# Patient Record
Sex: FEMALE | Race: WHITE | NOT HISPANIC OR LATINO | Employment: FULL TIME | URBAN - NONMETROPOLITAN AREA
[De-identification: names, ages, dates, MRNs, and addresses within clinical notes are randomized per-mention and may not be internally consistent; named-entity substitution may affect disease eponyms.]

---

## 2017-02-21 DIAGNOSIS — F90.2 ATTENTION DEFICIT HYPERACTIVITY DISORDER (ADHD), COMBINED TYPE: ICD-10-CM

## 2017-02-21 NOTE — TELEPHONE ENCOUNTER
Adderall XR 30mg - patient requesting 3 month of scripts to keep on profile with pharmacy      Last Written Prescription Date:10/17/2016  Last Fill Quantity: 30,  # refills:0  Last Office Visit with [facility], [facility] or [facility] prescribing provider: 10/07/2016    Adderall 5mg      Last Written Prescription Date: 08/04/2016  Last Fill Quantity: 30,  # refills: 0   Last Office Visit with [facility], [facility] or [facility] prescribing provider: 10/07/2016    Mikayla Elizondo, Pharmacy Technician  [facility]  [phone]

## 2017-02-23 RX ORDER — DEXTROAMPHETAMINE SACCHARATE, AMPHETAMINE ASPARTATE MONOHYDRATE, DEXTROAMPHETAMINE SULFATE AND AMPHETAMINE SULFATE 7.5; 7.5; 7.5; 7.5 MG/1; MG/1; MG/1; MG/1
30 CAPSULE, EXTENDED RELEASE ORAL DAILY
Qty: 30 CAPSULE | Refills: 0 | Status: SHIPPED | OUTPATIENT
Start: 2017-02-23 | End: 2017-04-25

## 2017-02-23 RX ORDER — DEXTROAMPHETAMINE SACCHARATE, AMPHETAMINE ASPARTATE, DEXTROAMPHETAMINE SULFATE AND AMPHETAMINE SULFATE 1.25; 1.25; 1.25; 1.25 MG/1; MG/1; MG/1; MG/1
5 TABLET ORAL DAILY
Qty: 30 TABLET | Refills: 0 | Status: SHIPPED | OUTPATIENT
Start: 2017-02-23 | End: 2017-08-14

## 2017-03-01 ENCOUNTER — OFFICE VISIT (OUTPATIENT)
Dept: FAMILY MEDICINE | Facility: OTHER | Age: 37
End: 2017-03-01
Payer: COMMERCIAL

## 2017-03-01 VITALS
SYSTOLIC BLOOD PRESSURE: 124 MMHG | RESPIRATION RATE: 20 BRPM | WEIGHT: 174 LBS | OXYGEN SATURATION: 100 % | TEMPERATURE: 97.6 F | DIASTOLIC BLOOD PRESSURE: 72 MMHG | HEART RATE: 82 BPM | HEIGHT: 65 IN | BODY MASS INDEX: 28.99 KG/M2

## 2017-03-01 DIAGNOSIS — J20.9 ACUTE BRONCHITIS, UNSPECIFIED ORGANISM: Primary | ICD-10-CM

## 2017-03-01 PROCEDURE — 99213 OFFICE O/P EST LOW 20 MIN: CPT | Performed by: NURSE PRACTITIONER

## 2017-03-01 RX ORDER — AZITHROMYCIN 250 MG/1
TABLET, FILM COATED ORAL
Qty: 6 TABLET | Refills: 0 | Status: SHIPPED | OUTPATIENT
Start: 2017-03-01 | End: 2017-08-14

## 2017-03-01 RX ORDER — CODEINE PHOSPHATE AND GUAIFENESIN 10; 100 MG/5ML; MG/5ML
1 SOLUTION ORAL EVERY 4 HOURS PRN
Qty: 120 ML | Refills: 0 | Status: SHIPPED | OUTPATIENT
Start: 2017-03-01 | End: 2017-08-14

## 2017-03-01 RX ORDER — PREDNISONE 20 MG/1
40 TABLET ORAL DAILY
Qty: 10 TABLET | Refills: 0 | Status: SHIPPED | OUTPATIENT
Start: 2017-03-01 | End: 2017-03-06

## 2017-03-01 NOTE — MR AVS SNAPSHOT
After Visit Summary   3/1/2017    Cecy Wilkerson    MRN: [id number]           Patient Information     Date Of Birth          1980        Visit Information        Provider Department      3/1/2017 8:40 AM Kimmie Sutton APRN [facility]        Today's Diagnoses     Acute bronchitis, unspecified organism    -  1      Care Instructions    Start the antibiotics if you get worse again.     Take the Prednisone once a day for 5 days.  Take in the morning with food.     Use the cough medication at night if needed.  This will make you drowsy.     Follow up if symptoms fail to resolve as expected.       Acute Bronchitis  Your healthcare provider has told you that you have acute bronchitis. Bronchitis is infection or inflammation of the bronchial tubes (airways in the lungs). Normally, air moves easily in and out of the airways. Bronchitis narrows the airways, making it harder for air to flow in and out of the lungs. This causes symptoms such as shortness of breath, coughing, and wheezing. Bronchitis can be  acute  or  chronic.  Acute means the condition comes on quickly and goes away in a short time. Chronic means a condition lasts a long time and often comes back. Read on to learn more about acute bronchitis.    What causes acute bronchitis?  Acute bronchitis almost always starts as a viral respiratory infection, such as a cold or the flu. Certain factors make it more likely for a cold or flu to turn into bronchitis. These include being very young or very old or having a heart or lung problem. Cigarette smoking also makes bronchitis more likely.  When bronchitis develops, the airways become swollen. The airways may also become infected with bacteria. This is known as a secondary infection.  Diagnosing acute bronchitis  Your healthcare provider will examine you and ask about your symptoms and health history. You may also have a sputum culture to test the fluid in your lungs. Chest X-rays may  be done to look for infection in the lungs.  Treating acute bronchitis  Bronchitis usually clears up as the cold or flu goes away. You can help feel better faster by doing the following:    Take medicine as directed. You may be told to take ibuprofen or other over-the-counter medicines. These help relieve inflammation in your bronchial tubes. Your doctor may prescribe an inhaler to help open up the bronchial tubes. Most of the time, acute bronchitis is caused by a viral infection. Antibiotics are usually not prescribed for viral infections.    Drink plenty of fluids, such as water, juice, or warm soup. Fluids loosen mucus so that you can cough it up. This helps you breathe more easily. Fluids also prevent dehydration.    Make sure you get plenty of rest.    Do not smoke. Do not allow anyone else to smoke in your home.  Recovery and follow-up  Follow up with your doctor as you are told. You will likely feel better in a week or two. But a dry cough can linger beyond that time. Let your doctor know if you still have symptoms (other than a dry cough) after 2 weeks. If you re prone to getting bronchial infections, let your doctor know. And take steps to protect yourself from future infections. These steps include stopping smoking and avoiding tobacco smoke, washing your hands often, and getting a yearly flu shot.  When to call the doctor  Call the doctor if you have any of the following:    Fever of 100.4 F (38.0 C) higher    Symptoms that get worse, or new symptoms    Trouble breathing    Symptoms that don t start to improve within a week, or within 3 days of taking antibiotics     0149-9473 [employer]. [address] [place] [address], [place] [address]. All rights reserved. This information is not intended as a substitute for professional medical care. Always follow your healthcare professional's instructions.              Follow-ups after your visit        Who to contact     If you have questions or need follow  "up information about today's clinic visit or your schedule please contact [facility] directly at [phone].  Normal or non-critical lab and imaging results will be communicated to you by [employer], letter or phone within 4 business days after the clinic has received the results. If you do not hear from us within 7 days, please contact the clinic through [employer] or phone. If you have a critical or abnormal lab result, we will notify you by phone as soon as possible.  Submit refill requests through [employer] or call your pharmacy and they will forward the refill request to us. Please allow 3 business days for your refill to be completed.          Additional Information About Your Visit        [employer] Information     [employer] gives you secure access to your electronic health record. If you see a primary care provider, you can also send messages to your care team and make appointments. If you have questions, please call your primary care clinic.  If you do not have a primary care provider, please call [phone] and they will assist you.        Care EveryWhere ID     This is your Care EveryWhere ID. This could be used by other organizations to access your [place] medical records  [id number]        Your Vitals Were     Pulse Temperature Respirations Height Pulse Oximetry BMI (Body Mass Index)    82 97.6  F (36.4  C) (Tympanic) 20 5' 4.5\" (1.638 m) 100% 29.41 kg/m2       Blood Pressure from Last 3 Encounters:   03/01/17 124/72   10/07/16 122/60   03/01/16 122/78    Weight from Last 3 Encounters:   03/01/17 174 lb (78.9 kg)   10/07/16 166 lb (75.3 kg)   03/01/16 167 lb (75.8 kg)              Today, you had the following     No orders found for display         Today's Medication Changes          These changes are accurate as of: 3/1/17  9:02 AM.  If you have any questions, ask your nurse or doctor.               Start taking these medicines.        Dose/Directions    azithromycin 250 MG tablet   Commonly " known as:  ZITHROMAX   Used for:  Acute bronchitis, unspecified organism   Started by:  Kimmie Sutton APRN CNP        Two tablets first day, then one tablet daily for four days.   Quantity:  6 tablet   Refills:  0       guaiFENesin-codeine 100-10 MG/5ML Soln solution   Commonly known as:  ROBITUSSIN AC   Used for:  Acute bronchitis, unspecified organism   Started by:  Kimmie Sutton APRN CNP        Dose:  1 tsp.   Take 5 mLs by mouth every 4 hours as needed for cough   Quantity:  120 mL   Refills:  0       predniSONE 20 MG tablet   Commonly known as:  DELTASONE   Used for:  Acute bronchitis, unspecified organism   Started by:  Kimmie Sutton APRN CNP        Dose:  40 mg   Take 2 tablets (40 mg) by mouth daily for 5 days   Quantity:  10 tablet   Refills:  0            Where to get your medicines      These medications were sent to [facility] - [place], [place] - [address] [place] [address]  [address] [place] [id number]    Hours:  M-F 8:30-6:30; Sat 9-4; closed Sunday Phone:  [phone]     predniSONE 20 MG tablet         Some of these will need a paper prescription and others can be bought over the counter.  Ask your nurse if you have questions.     Bring a paper prescription for each of these medications     azithromycin 250 MG tablet    guaiFENesin-codeine 100-10 MG/5ML Soln solution                Primary Care Provider Office Phone # Fax #    Ratna Jacob PA-C [phone]       [address] [place] [address]        Thank you!     Thank you for choosing [facility]  for your care. Our goal is always to provide you with excellent care. Hearing back from our patients is one way we can continue to improve our services. Please take a few minutes to complete the written survey that you may receive in the mail after your visit with us. Thank you!             Your Updated Medication List - Protect others around you: Learn how to safely use, store  and throw away your medicines at www.disposemymeds.org.          This list is accurate as of: 3/1/17  9:02 AM.  Always use your most recent med list.                   Brand Name Dispense Instructions for use    * amphetamine-dextroamphetamine 30 MG per 24 hr capsule    ADDERALL XR    30 capsule    Take 1 capsule (30 mg) by mouth daily       * amphetamine-dextroamphetamine 5 MG per tablet    ADDERALL    30 tablet    Take 1 tablet (5 mg) by mouth daily Use as needed early afternoon.       azithromycin 250 MG tablet    ZITHROMAX    6 tablet    Two tablets first day, then one tablet daily for four days.       guaiFENesin-codeine 100-10 MG/5ML Soln solution    ROBITUSSIN AC    120 mL    Take 5 mLs by mouth every 4 hours as needed for cough       predniSONE 20 MG tablet    DELTASONE    10 tablet    Take 2 tablets (40 mg) by mouth daily for 5 days       * Notice:  This list has 2 medication(s) that are the same as other medications prescribed for you. Read the directions carefully, and ask your doctor or other care provider to review them with you.

## 2017-03-01 NOTE — NURSING NOTE
"Chief Complaint   Patient presents with     URI     x1 1/2 months       Initial /72 (BP Location: Right arm, Cuff Size: Adult Large)  Pulse 82  Temp 97.6  F (36.4  C) (Tympanic)  Resp 20  Ht 5' 4.5\" (1.638 m)  Wt 174 lb (78.9 kg)  SpO2 100%  BMI 29.41 kg/m2 Estimated body mass index is 29.41 kg/(m^2) as calculated from the following:    Height as of this encounter: 5' 4.5\" (1.638 m).    Weight as of this encounter: 174 lb (78.9 kg).  Medication Reconciliation: complete   ................Alpesh Alanis LPN,   March 1, 2017,      8:50 AM,   [facility]     "

## 2017-03-01 NOTE — PATIENT INSTRUCTIONS
Start the antibiotics if you get worse again.     Take the Prednisone once a day for 5 days.  Take in the morning with food.     Use the cough medication at night if needed.  This will make you drowsy.     Follow up if symptoms fail to resolve as expected.       Acute Bronchitis  Your healthcare provider has told you that you have acute bronchitis. Bronchitis is infection or inflammation of the bronchial tubes (airways in the lungs). Normally, air moves easily in and out of the airways. Bronchitis narrows the airways, making it harder for air to flow in and out of the lungs. This causes symptoms such as shortness of breath, coughing, and wheezing. Bronchitis can be  acute  or  chronic.  Acute means the condition comes on quickly and goes away in a short time. Chronic means a condition lasts a long time and often comes back. Read on to learn more about acute bronchitis.    What causes acute bronchitis?  Acute bronchitis almost always starts as a viral respiratory infection, such as a cold or the flu. Certain factors make it more likely for a cold or flu to turn into bronchitis. These include being very young or very old or having a heart or lung problem. Cigarette smoking also makes bronchitis more likely.  When bronchitis develops, the airways become swollen. The airways may also become infected with bacteria. This is known as a secondary infection.  Diagnosing acute bronchitis  Your healthcare provider will examine you and ask about your symptoms and health history. You may also have a sputum culture to test the fluid in your lungs. Chest X-rays may be done to look for infection in the lungs.  Treating acute bronchitis  Bronchitis usually clears up as the cold or flu goes away. You can help feel better faster by doing the following:    Take medicine as directed. You may be told to take ibuprofen or other over-the-counter medicines. These help relieve inflammation in your bronchial tubes. Your doctor may prescribe  an inhaler to help open up the bronchial tubes. Most of the time, acute bronchitis is caused by a viral infection. Antibiotics are usually not prescribed for viral infections.    Drink plenty of fluids, such as water, juice, or warm soup. Fluids loosen mucus so that you can cough it up. This helps you breathe more easily. Fluids also prevent dehydration.    Make sure you get plenty of rest.    Do not smoke. Do not allow anyone else to smoke in your home.  Recovery and follow-up  Follow up with your doctor as you are told. You will likely feel better in a week or two. But a dry cough can linger beyond that time. Let your doctor know if you still have symptoms (other than a dry cough) after 2 weeks. If you re prone to getting bronchial infections, let your doctor know. And take steps to protect yourself from future infections. These steps include stopping smoking and avoiding tobacco smoke, washing your hands often, and getting a yearly flu shot.  When to call the doctor  Call the doctor if you have any of the following:    Fever of 100.4 F (38.0 C) higher    Symptoms that get worse, or new symptoms    Trouble breathing    Symptoms that don t start to improve within a week, or within 3 days of taking antibiotics     4539-0870 [employer]. [address] [place] [address], [place] [address]. All rights reserved. This information is not intended as a substitute for professional medical care. Always follow your healthcare professional's instructions.

## 2017-03-01 NOTE — PROGRESS NOTES
SUBJECTIVE:                                                    Cecy Wilkerson is a 37 year old female who presents to clinic today for the following health issues:      RESPIRATORY SYMPTOMS      Duration: 1.5 months    Description  cough and mucous, runny nose, congestion    Severity: moderate    Accompanying signs and symptoms: chest feels heavy    History (predisposing factors):  none    Precipitating or alleviating factors: None    Therapies tried and outcome:  Nyquil, sudafed; neither helped much.     Symptoms improved today. She considered cancelling her appointment, but she was improving two weeks ago as well and then got worse again.            Problem list and histories reviewed & adjusted, as indicated.  Additional history: none    Patient Active Problem List   Diagnosis     CARDIOVASCULAR SCREENING; LDL GOAL LESS THAN 160     H/O:      Generalized anxiety disorder     Generalized hyperhidrosis     External hemorrhoids     Attention deficit hyperactivity disorder (ADHD), combined type     Past Surgical History   Procedure Laterality Date     Excise lesn neck/chest,subcutan  childhood     remove some lumps from the neck as a child      section  12/15/2010      SECTION performed by ROSA HYMAN at  [facility]     Colonoscopy N/A 2015     Procedure: COMBINED COLONOSCOPY, SINGLE OR MULTIPLE BIOPSY/POLYPECTOMY BY BIOPSY;  Surgeon: Zenon Jay MD;  Location:  GI     Esophagoscopy, gastroscopy, duodenoscopy (egd), combined N/A 2015     Procedure: COMBINED ESOPHAGOSCOPY, GASTROSCOPY, DUODENOSCOPY (EGD), BIOPSY SINGLE OR MULTIPLE;  Surgeon: Zenon Jay MD;  Location:  GI     Hemorrhoidectomy external N/A 2015     Procedure: HEMORRHOIDECTOMY EXTERNAL;  Surgeon: Jose Potter MD;  Location:  OR       Social History   Substance Use Topics     Smoking status: Former Smoker     Packs/day: 1.00     Years: 3.00     Quit date: 2005     Smokeless tobacco:  Never Used     Alcohol use No      Comment: 1 drink every 2 weeks or so     Family History   Problem Relation Age of Onset     Alcohol/Drug Father      Simone's father     Alcohol/Drug Sister      Simone's sister     Alcohol/Drug Brother      Simone's brother     Allergies Mother      bees and grass,pollens     Allergies Brother      Grass and pollens.     DIABETES Maternal Grandmother      Adult onset     HEART DISEASE Maternal Grandmother      Hypertension Maternal Grandmother      Lipids Maternal Grandmother      Neurologic Disorder Brother      Respiratory Mother      asthma     Respiratory Brother      asthma     CEREBROVASCULAR DISEASE Paternal Grandmother      CVa age 35     Obesity Maternal Aunt      Obesity Maternal Uncle      GASTROINTESTINAL DISEASE Other      Crohn's first cousin         Current Outpatient Prescriptions   Medication Sig Dispense Refill     amphetamine-dextroamphetamine (ADDERALL XR) 30 MG per 24 hr capsule Take 1 capsule (30 mg) by mouth daily 30 capsule 0     amphetamine-dextroamphetamine (ADDERALL) 5 MG per tablet Take 1 tablet (5 mg) by mouth daily Use as needed early afternoon. 30 tablet 0     Allergies   Allergen Reactions     Nkda [No Known Drug Allergies]      No Known Allergies      BP Readings from Last 3 Encounters:   03/01/17 124/72   10/07/16 122/60   03/01/16 122/78    Wt Readings from Last 3 Encounters:   03/01/17 174 lb (78.9 kg)   10/07/16 166 lb (75.3 kg)   03/01/16 167 lb (75.8 kg)                    Reviewed and updated as needed this visit by clinical staff  Tobacco  Allergies  Meds       Reviewed and updated as needed this visit by Provider         ROS:  CONSTITUTIONAL:POSITIVE  for fatigue and NEGATIVE  for fever   ENT/MOUTH: POSITIVE for nasal congestion and NEGATIVE for fever  RESP:POSITIVE for cough-productive and dyspnea on exertion, NEGATIVE for  Hx asthma, Hx COPD, SOB/dyspnea and wheezing  CV: NEGATIVE for chest pain, palpitations or peripheral  "edema    OBJECTIVE:                                                    /72 (BP Location: Right arm, Cuff Size: Adult Large)  Pulse 82  Temp 97.6  F (36.4  C) (Tympanic)  Resp 20  Ht 5' 4.5\" (1.638 m)  Wt 174 lb (78.9 kg)  SpO2 100%  BMI 29.41 kg/m2  Body mass index is 29.41 kg/(m^2).  GENERAL: healthy, alert and no distress  HENT: ear canals and TM's normal, nose and mouth without ulcers or lesions  NECK: no adenopathy, no asymmetry, masses, or scars and thyroid normal to palpation  RESP: lungs clear to auscultation - no rales, rhonchi or wheezes  CV: regular rate and rhythm, normal S1 S2, no S3 or S4, no murmur, click or rub, no peripheral edema and peripheral pulses strong  MS: no gross musculoskeletal defects noted, no edema    Diagnostic Test Results:  none      ASSESSMENT/PLAN:                                                        1. Acute bronchitis, unspecified organism  She is improving now and her lungs are clear on exam today.  Will give her a paper prescription for antibiotics to start only if her symptoms worsen again.  Will treat symptoms with Prednisone x 5 days  and Robitussin as needed.   - azithromycin (ZITHROMAX) 250 MG tablet; Two tablets first day, then one tablet daily for four days.  Dispense: 6 tablet; Refill: 0  - guaiFENesin-codeine (ROBITUSSIN AC) 100-10 MG/5ML SOLN solution; Take 5 mLs by mouth every 4 hours as needed for cough  Dispense: 120 mL; Refill: 0  - predniSONE (DELTASONE) 20 MG tablet; Take 2 tablets (40 mg) by mouth daily for 5 days  Dispense: 10 tablet; Refill: 0    FUTURE APPOINTMENTS:       - Follow up if symptoms fail to resolve as expected.   See Patient Instructions    BERNADETTE Estevez [facility]    "

## 2017-04-25 DIAGNOSIS — F90.2 ATTENTION DEFICIT HYPERACTIVITY DISORDER (ADHD), COMBINED TYPE: ICD-10-CM

## 2017-04-25 NOTE — TELEPHONE ENCOUNTER
adderall XR 30mg      Last Written Prescription Date:  02/23/17  Last Fill Quantity: 30,   # refills: 0  Last Office Visit with [facility] provider: 03/01/17  Future Office visit:       Routing refill request to provider for review/approval because:  Drug not on the [facility] refill protocol or controlled substance    On behalf of [facility]  [facility] [place]

## 2017-04-26 RX ORDER — DEXTROAMPHETAMINE SACCHARATE, AMPHETAMINE ASPARTATE MONOHYDRATE, DEXTROAMPHETAMINE SULFATE AND AMPHETAMINE SULFATE 7.5; 7.5; 7.5; 7.5 MG/1; MG/1; MG/1; MG/1
30 CAPSULE, EXTENDED RELEASE ORAL DAILY
Qty: 30 CAPSULE | Refills: 0 | Status: SHIPPED | OUTPATIENT
Start: 2017-04-26 | End: 2017-06-20

## 2017-07-25 DIAGNOSIS — F90.2 ATTENTION DEFICIT HYPERACTIVITY DISORDER (ADHD), COMBINED TYPE: ICD-10-CM

## 2017-07-25 RX ORDER — DEXTROAMPHETAMINE SACCHARATE, AMPHETAMINE ASPARTATE MONOHYDRATE, DEXTROAMPHETAMINE SULFATE AND AMPHETAMINE SULFATE 7.5; 7.5; 7.5; 7.5 MG/1; MG/1; MG/1; MG/1
30 CAPSULE, EXTENDED RELEASE ORAL DAILY
Qty: 30 CAPSULE | Refills: 0 | Status: SHIPPED | OUTPATIENT
Start: 2017-07-25 | End: 2017-08-14

## 2017-07-25 NOTE — TELEPHONE ENCOUNTER
adderall      Last Written Prescription Date: 06/21/2017  Last Fill Quantity: 30,  # refills: 0   Last Office Visit with [facility] or [facility] prescribing provider: 10/07/2016    Thank You,  Sandeep Miles, Pharmacy [facility] [place]

## 2017-08-14 ENCOUNTER — OFFICE VISIT (OUTPATIENT)
Dept: FAMILY MEDICINE | Facility: CLINIC | Age: 37
End: 2017-08-14
Payer: COMMERCIAL

## 2017-08-14 VITALS
TEMPERATURE: 97.4 F | OXYGEN SATURATION: 99 % | HEART RATE: 87 BPM | WEIGHT: 174 LBS | RESPIRATION RATE: 18 BRPM | BODY MASS INDEX: 29.41 KG/M2 | SYSTOLIC BLOOD PRESSURE: 116 MMHG | DIASTOLIC BLOOD PRESSURE: 70 MMHG

## 2017-08-14 DIAGNOSIS — F90.2 ATTENTION DEFICIT HYPERACTIVITY DISORDER (ADHD), COMBINED TYPE: ICD-10-CM

## 2017-08-14 PROCEDURE — 99213 OFFICE O/P EST LOW 20 MIN: CPT | Performed by: PHYSICIAN ASSISTANT

## 2017-08-14 RX ORDER — DEXTROAMPHETAMINE SACCHARATE, AMPHETAMINE ASPARTATE, DEXTROAMPHETAMINE SULFATE AND AMPHETAMINE SULFATE 1.25; 1.25; 1.25; 1.25 MG/1; MG/1; MG/1; MG/1
5 TABLET ORAL DAILY
Qty: 30 TABLET | Refills: 0 | Status: SHIPPED | OUTPATIENT
Start: 2017-09-19 | End: 2017-08-14

## 2017-08-14 RX ORDER — DEXTROAMPHETAMINE SACCHARATE, AMPHETAMINE ASPARTATE, DEXTROAMPHETAMINE SULFATE AND AMPHETAMINE SULFATE 1.25; 1.25; 1.25; 1.25 MG/1; MG/1; MG/1; MG/1
5 TABLET ORAL DAILY
Qty: 30 TABLET | Refills: 0 | Status: SHIPPED | OUTPATIENT
Start: 2017-08-22 | End: 2017-08-14

## 2017-08-14 RX ORDER — DEXTROAMPHETAMINE SACCHARATE, AMPHETAMINE ASPARTATE, DEXTROAMPHETAMINE SULFATE AND AMPHETAMINE SULFATE 1.25; 1.25; 1.25; 1.25 MG/1; MG/1; MG/1; MG/1
5 TABLET ORAL DAILY
Qty: 30 TABLET | Refills: 0 | Status: SHIPPED | OUTPATIENT
Start: 2017-10-17 | End: 2017-11-06

## 2017-08-14 RX ORDER — DEXTROAMPHETAMINE SACCHARATE, AMPHETAMINE ASPARTATE MONOHYDRATE, DEXTROAMPHETAMINE SULFATE AND AMPHETAMINE SULFATE 7.5; 7.5; 7.5; 7.5 MG/1; MG/1; MG/1; MG/1
30 CAPSULE, EXTENDED RELEASE ORAL DAILY
Qty: 30 CAPSULE | Refills: 0 | Status: SHIPPED | OUTPATIENT
Start: 2017-09-19 | End: 2017-08-14

## 2017-08-14 RX ORDER — DEXTROAMPHETAMINE SACCHARATE, AMPHETAMINE ASPARTATE MONOHYDRATE, DEXTROAMPHETAMINE SULFATE AND AMPHETAMINE SULFATE 7.5; 7.5; 7.5; 7.5 MG/1; MG/1; MG/1; MG/1
30 CAPSULE, EXTENDED RELEASE ORAL DAILY
Qty: 30 CAPSULE | Refills: 0 | Status: SHIPPED | OUTPATIENT
Start: 2017-10-17 | End: 2017-11-06 | Stop reason: ALTCHOICE

## 2017-08-14 RX ORDER — DEXTROAMPHETAMINE SACCHARATE, AMPHETAMINE ASPARTATE MONOHYDRATE, DEXTROAMPHETAMINE SULFATE AND AMPHETAMINE SULFATE 7.5; 7.5; 7.5; 7.5 MG/1; MG/1; MG/1; MG/1
30 CAPSULE, EXTENDED RELEASE ORAL DAILY
Qty: 30 CAPSULE | Refills: 0 | Status: SHIPPED | OUTPATIENT
Start: 2017-08-22 | End: 2017-08-14

## 2017-08-14 ASSESSMENT — PAIN SCALES - GENERAL: PAINLEVEL: NO PAIN (0)

## 2017-08-14 NOTE — PROGRESS NOTES
SUBJECTIVE:                                                    Cecy Wilkerson is a 37 year old female who presents to clinic today for the following health issues:      Recheck ADHD   Currently using 30 mg extended release Adderall in the morning and 5 mg early afternoon when needed. Just recently picked up her refill and is not due today for this, but is requesting that I do 3 months at a time.    Amount of exercise or physical activity: None    Problems taking medications regularly: Yes,  problems remembering to take    Medication side effects: none    Diet: regular (no restrictions)            Problem list and histories reviewed & adjusted, as indicated.  Additional history: as documented    Patient Active Problem List   Diagnosis     CARDIOVASCULAR SCREENING; LDL GOAL LESS THAN 160     H/O:      Generalized anxiety disorder     Generalized hyperhidrosis     External hemorrhoids     Attention deficit hyperactivity disorder (ADHD), combined type     Past Surgical History:   Procedure Laterality Date      SECTION  12/15/2010     SECTION performed by ROSA HYMAN at  [facility]     COLONOSCOPY N/A 2015    Procedure: COMBINED COLONOSCOPY, SINGLE OR MULTIPLE BIOPSY/POLYPECTOMY BY BIOPSY;  Surgeon: Zenon Jay MD;  Location:  GI     ESOPHAGOSCOPY, GASTROSCOPY, DUODENOSCOPY (EGD), COMBINED N/A 2015    Procedure: COMBINED ESOPHAGOSCOPY, GASTROSCOPY, DUODENOSCOPY (EGD), BIOPSY SINGLE OR MULTIPLE;  Surgeon: Zenon Jay MD;  Location:  GI     EXCISE LESN NECK/CHEST,SUBCUTAN  childhood    remove some lumps from the neck as a child     HEMORRHOIDECTOMY EXTERNAL N/A 2015    Procedure: HEMORRHOIDECTOMY EXTERNAL;  Surgeon: Jose Potter MD;  Location:  OR       Social History   Substance Use Topics     Smoking status: Former Smoker     Packs/day: 1.00     Years: 3.00     Quit date: 2005     Smokeless tobacco: Never Used     Alcohol use No      Comment: 1 drink  every 2 weeks or so     Family History   Problem Relation Age of Onset     Allergies Mother      bees and grass,pollens     Respiratory Mother      asthma     Alcohol/Drug Father      Simone's father     Alcohol/Drug Sister      Simone's sister     Alcohol/Drug Brother      Simone's brother     Allergies Brother      Grass and pollens.     DIABETES Maternal Grandmother      Adult onset     HEART DISEASE Maternal Grandmother      Hypertension Maternal Grandmother      Lipids Maternal Grandmother      Neurologic Disorder Brother      Respiratory Brother      asthma     CEREBROVASCULAR DISEASE Paternal Grandmother      CVa age 35     Obesity Maternal Aunt      Obesity Maternal Uncle      GASTROINTESTINAL DISEASE Other      Crohn's first cousin             Reviewed and updated as needed this visit by clinical staff     Reviewed and updated as needed this visit by Provider         ROS:  Constitutional, HEENT, cardiovascular, pulmonary, gi and gu systems are negative, except as otherwise noted.      OBJECTIVE:   /70  Pulse 87  Temp 97.4  F (36.3  C) (Tympanic)  Resp 18  Wt 174 lb (78.9 kg)  SpO2 99%  BMI 29.41 kg/m2  Body mass index is 29.41 kg/(m^2).   GENERAL: healthy, alert and no distress  RESP: lungs clear to auscultation - no rales, rhonchi or wheezes  CV: regular rate and rhythm, normal S1 S2, no S3 or S4, no murmur, click or rub, no peripheral edema and peripheral pulses strong    Diagnostic Test Results:  none     ASSESSMENT:   Attention deficit hyperactivity disorder (ADHD), combined type      PLAN:       ICD-10-CM    1. Attention deficit hyperactivity disorder (ADHD), combined type F90.2 amphetamine-dextroamphetamine (ADDERALL XR) 30 MG per 24 hr capsule     amphetamine-dextroamphetamine (ADDERALL) 5 MG per tablet     DISCONTINUED: amphetamine-dextroamphetamine (ADDERALL XR) 30 MG per 24 hr capsule     DISCONTINUED: amphetamine-dextroamphetamine (ADDERALL) 5 MG per tablet     DISCONTINUED:  amphetamine-dextroamphetamine (ADDERALL XR) 30 MG per 24 hr capsule     DISCONTINUED: amphetamine-dextroamphetamine (ADDERALL) 5 MG per tablet           MEDICATIONS:        - Continue other medications without change       - She did not leave with a prescription today as she recently picked up her prescription-3 months worth of medication was sent to her pharmacy to hold.  FUTURE APPOINTMENTS:       - Follow-up visit in 6 months-she will be due for her yearly visit/physical at that point as well.    Ratna Jacob PA-C  [facility]  3 months of controlled medications written out in monthly prescriptions.  Patient is up to date on follow up visits expected with this medication.  Electronically signed by Ratna Jacob PA-C  8/14/2017      Orders Placed This Encounter     DISCONTD: amphetamine-dextroamphetamine (ADDERALL XR) 30 MG per 24 hr capsule     DISCONTD: amphetamine-dextroamphetamine (ADDERALL) 5 MG per tablet     DISCONTD: amphetamine-dextroamphetamine (ADDERALL XR) 30 MG per 24 hr capsule     DISCONTD: amphetamine-dextroamphetamine (ADDERALL) 5 MG per tablet     amphetamine-dextroamphetamine (ADDERALL XR) 30 MG per 24 hr capsule     amphetamine-dextroamphetamine (ADDERALL) 5 MG per tablet       AVS given to patient upon discharge today.  Electronically signed by Ratna Jacob PA-C  August 14, 2017  11:05 AM

## 2017-08-14 NOTE — NURSING NOTE
"Chief Complaint   Patient presents with     A.D.H.D       Initial /70  Pulse 87  Temp 97.4  F (36.3  C) (Tympanic)  Resp 18  Wt 174 lb (78.9 kg)  SpO2 99%  BMI 29.41 kg/m2 Estimated body mass index is 29.41 kg/(m^2) as calculated from the following:    Height as of 3/1/17: 5' 4.5\" (1.638 m).    Weight as of this encounter: 174 lb (78.9 kg).  BP completed using cuff size: ana Argueta MA      "

## 2017-08-14 NOTE — MR AVS SNAPSHOT
After Visit Summary   8/14/2017    Cecy Wilkerson    MRN: [id number]           Patient Information     Date Of Birth          1980        Visit Information        Provider Department      8/14/2017 9:45 AM Ratna Jacob PA-C [facility]        Today's Diagnoses     Attention deficit hyperactivity disorder (ADHD), combined type           Follow-ups after your visit        Who to contact     If you have questions or need follow up information about today's clinic visit or your schedule please contact [facility] directly at [phone].  Normal or non-critical lab and imaging results will be communicated to you by Notehart, letter or phone within 4 business days after the clinic has received the results. If you do not hear from us within 7 days, please contact the clinic through Y-Clientst or phone. If you have a critical or abnormal lab result, we will notify you by phone as soon as possible.  Submit refill requests through Pixim or call your pharmacy and they will forward the refill request to us. Please allow 3 business days for your refill to be completed.          Additional Information About Your Visit        MyChart Information     Pixim gives you secure access to your electronic health record. If you see a primary care provider, you can also send messages to your care team and make appointments. If you have questions, please call your primary care clinic.  If you do not have a primary care provider, please call [phone] and they will assist you.        Care EveryWhere ID     This is your Care EveryWhere ID. This could be used by other organizations to access your [place] medical records  [id number]        Your Vitals Were     Pulse Temperature Respirations Pulse Oximetry BMI (Body Mass Index)       87 97.4  F (36.3  C) (Tympanic) 18 99% 29.41 kg/m2        Blood Pressure from Last 3 Encounters:   08/14/17 116/70   03/01/17 124/72   10/07/16 122/60     Weight from Last 3 Encounters:   08/14/17 174 lb (78.9 kg)   03/01/17 174 lb (78.9 kg)   10/07/16 166 lb (75.3 kg)              Today, you had the following     No orders found for display         Today's Medication Changes          These changes are accurate as of: 8/14/17 11:06 AM.  If you have any questions, ask your nurse or doctor.               Start taking these medicines.        Dose/Directions    * amphetamine-dextroamphetamine 30 MG per 24 hr capsule   Commonly known as:  ADDERALL XR   Used for:  Attention deficit hyperactivity disorder (ADHD), combined type   Started by:  Ratna Jacob PA-C        Dose:  30 mg   Start taking on:  10/17/2017   Take 1 capsule (30 mg) by mouth daily   Quantity:  30 capsule   Refills:  0       * amphetamine-dextroamphetamine 5 MG per tablet   Commonly known as:  ADDERALL   Used for:  Attention deficit hyperactivity disorder (ADHD), combined type   Started by:  Ratna Jacob PA-C        Dose:  5 mg   Start taking on:  10/17/2017   Take 1 tablet (5 mg) by mouth daily Use as needed early afternoon.   Quantity:  30 tablet   Refills:  0       * Notice:  This list has 2 medication(s) that are the same as other medications prescribed for you. Read the directions carefully, and ask your doctor or other care provider to review them with you.         Where to get your medicines      Some of these will need a paper prescription and others can be bought over the counter.  Ask your nurse if you have questions.     Bring a paper prescription for each of these medications     amphetamine-dextroamphetamine 30 MG per 24 hr capsule    amphetamine-dextroamphetamine 5 MG per tablet                Primary Care Provider Office Phone # Fax #    Ratna Jacob PA-C [phone] [address] [place] [address]        Equal Access to Services     [facility] BLANCHE AH: Nicko Sosa, perez arzate, alexsandra chaudhry  emmanuelle grijalva ah. [facility] [phone].    ATENCIÓN: Si brendan ahuja, tiene a worley disposición servicios gratuitos de asistencia lingüística. Carlotta al [phone].    We comply with applicable federal civil rights laws and [place] laws. We do not discriminate on the basis of race, color, national origin, age, disability sex, sexual orientation or gender identity.            Thank you!     Thank you for choosing [facility]  for your care. Our goal is always to provide you with excellent care. Hearing back from our patients is one way we can continue to improve our services. Please take a few minutes to complete the written survey that you may receive in the mail after your visit with us. Thank you!             Your Updated Medication List - Protect others around you: Learn how to safely use, store and throw away your medicines at www.disposemymeds.org.          This list is accurate as of: 8/14/17 11:06 AM.  Always use your most recent med list.                   Brand Name Dispense Instructions for use Diagnosis    * amphetamine-dextroamphetamine 30 MG per 24 hr capsule   Start taking on:  10/17/2017    ADDERALL XR    30 capsule    Take 1 capsule (30 mg) by mouth daily    Attention deficit hyperactivity disorder (ADHD), combined type       * amphetamine-dextroamphetamine 5 MG per tablet   Start taking on:  10/17/2017    ADDERALL    30 tablet    Take 1 tablet (5 mg) by mouth daily Use as needed early afternoon.    Attention deficit hyperactivity disorder (ADHD), combined type       * Notice:  This list has 2 medication(s) that are the same as other medications prescribed for you. Read the directions carefully, and ask your doctor or other care provider to review them with you.

## 2017-11-06 ENCOUNTER — E-VISIT (OUTPATIENT)
Dept: FAMILY MEDICINE | Facility: CLINIC | Age: 37
End: 2017-11-06
Payer: COMMERCIAL

## 2017-11-06 DIAGNOSIS — F90.2 ATTENTION DEFICIT HYPERACTIVITY DISORDER (ADHD), COMBINED TYPE: Primary | ICD-10-CM

## 2017-11-06 PROCEDURE — 99444 ZZC PHYSICIAN ONLINE EVALUATION & MANAGEMENT SERVICE: CPT | Performed by: PHYSICIAN ASSISTANT

## 2017-11-06 RX ORDER — LISDEXAMFETAMINE DIMESYLATE 30 MG/1
30 CAPSULE ORAL EVERY MORNING
Qty: 30 CAPSULE | Refills: 0 | Status: SHIPPED | OUTPATIENT
Start: 2017-11-06 | End: 2017-11-29 | Stop reason: DRUGHIGH

## 2017-11-07 ENCOUNTER — MYC MEDICAL ADVICE (OUTPATIENT)
Dept: FAMILY MEDICINE | Facility: CLINIC | Age: 37
End: 2017-11-07

## 2017-11-07 DIAGNOSIS — F90.2 ATTENTION DEFICIT HYPERACTIVITY DISORDER (ADHD), COMBINED TYPE: Primary | ICD-10-CM

## 2017-11-29 RX ORDER — LISDEXAMFETAMINE DIMESYLATE 40 MG/1
40 CAPSULE ORAL EVERY MORNING
Qty: 14 CAPSULE | Refills: 0 | Status: SHIPPED | OUTPATIENT
Start: 2017-11-29 | End: 2018-01-15 | Stop reason: ALTCHOICE

## 2018-01-15 DIAGNOSIS — F90.2 ATTENTION DEFICIT HYPERACTIVITY DISORDER (ADHD), COMBINED TYPE: ICD-10-CM

## 2018-01-15 RX ORDER — DEXTROAMPHETAMINE SACCHARATE, AMPHETAMINE ASPARTATE MONOHYDRATE, DEXTROAMPHETAMINE SULFATE AND AMPHETAMINE SULFATE 7.5; 7.5; 7.5; 7.5 MG/1; MG/1; MG/1; MG/1
30 CAPSULE, EXTENDED RELEASE ORAL DAILY
Qty: 30 CAPSULE | Refills: 0 | Status: SHIPPED | OUTPATIENT
Start: 2018-01-15 | End: 2018-01-15

## 2018-01-15 RX ORDER — DEXTROAMPHETAMINE SACCHARATE, AMPHETAMINE ASPARTATE MONOHYDRATE, DEXTROAMPHETAMINE SULFATE AND AMPHETAMINE SULFATE 7.5; 7.5; 7.5; 7.5 MG/1; MG/1; MG/1; MG/1
30 CAPSULE, EXTENDED RELEASE ORAL DAILY
Qty: 30 CAPSULE | Refills: 0 | Status: ON HOLD | OUTPATIENT
Start: 2018-03-12 | End: 2018-03-19

## 2018-01-15 RX ORDER — DEXTROAMPHETAMINE SACCHARATE, AMPHETAMINE ASPARTATE MONOHYDRATE, DEXTROAMPHETAMINE SULFATE AND AMPHETAMINE SULFATE 7.5; 7.5; 7.5; 7.5 MG/1; MG/1; MG/1; MG/1
30 CAPSULE, EXTENDED RELEASE ORAL DAILY
Qty: 30 CAPSULE | Refills: 0 | Status: SHIPPED | OUTPATIENT
Start: 2018-02-12 | End: 2018-01-15

## 2018-02-16 ENCOUNTER — OFFICE VISIT (OUTPATIENT)
Dept: FAMILY MEDICINE | Facility: CLINIC | Age: 38
End: 2018-02-16
Payer: COMMERCIAL

## 2018-02-16 VITALS
HEIGHT: 64 IN | WEIGHT: 170 LBS | DIASTOLIC BLOOD PRESSURE: 66 MMHG | RESPIRATION RATE: 20 BRPM | BODY MASS INDEX: 29.02 KG/M2 | TEMPERATURE: 98.3 F | SYSTOLIC BLOOD PRESSURE: 122 MMHG | HEART RATE: 110 BPM | OXYGEN SATURATION: 99 %

## 2018-02-16 DIAGNOSIS — M77.12 LEFT TENNIS ELBOW: Primary | ICD-10-CM

## 2018-02-16 PROCEDURE — 99214 OFFICE O/P EST MOD 30 MIN: CPT | Performed by: FAMILY MEDICINE

## 2018-02-16 RX ORDER — MELOXICAM 15 MG/1
15 TABLET ORAL DAILY
Qty: 30 TABLET | Refills: 1 | Status: SHIPPED | OUTPATIENT
Start: 2018-02-16 | End: 2018-07-05

## 2018-02-16 ASSESSMENT — PAIN SCALES - GENERAL: PAINLEVEL: MODERATE PAIN (4)

## 2018-02-16 NOTE — PROGRESS NOTES
SUBJECTIVE:   Cecy Wilkerson is a 38 year old female who presents to clinic today for the following health issues:      Joint Pain    Onset: 5-6 months.     Description:   Location: left elbow  Character: Dull ache and Burning    Intensity: 4/10    Progression of Symptoms: worse    Accompanying Signs & Symptoms:  Other symptoms: radiation of pain to forearm and numbness, aches all day    History:   Previous similar pain: no       Precipitating factors:   Trauma or overuse: YES    Alleviating factors:  Improved by: rest/inactivity, ice, immobilization, stretching, acetaminophen and Ibuprofen    Therapies Tried and outcome:             Problem list and histories reviewed & adjusted, as indicated.  Additional history: as documented        Reviewed and updated as needed this visit by clinical staff  Tobacco  Allergies  Meds  Problems  Soc Hx      Reviewed and updated as needed this visit by Provider  Allergies  Meds  Problems         Patient here today for evaluation of severe elbow pain has been going on for 5-6 months.  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  It started shortly after the school year started.  It is exacerbated by activities that involve grasping and pronating/supinating her left hand.  She also has pain with lifting items.  She cannot drive with just her left hand on the wheel.  She requires assistance with her right hand.  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    Patient has been using a tennis elbow strap as she felt like she had tennis elbow.  She states that the strap actually makes the pain worse.  The achiness is keeping her awake at night.    Other symptoms as noted above.  She has not had previous issues with elbow pain prior to these issues started.    She has only taken ibuprofen 1-2 times daily but is taking 600-800 mg when she is  "taking it.    ROS:  10 point ROS of systems including Constitutional, Eyes, HENT, Respiratory, Cardiovascular, Gastroenterology, Genitourinary, Integumentary, Muscularskeletal, Psychiatric were all negative except for pertinent positives noted in my HPI.     OBJECTIVE:   /66  Pulse 110  Temp 98.3  F (36.8  C) (Temporal)  Resp 20  Ht 5' 4\" (1.626 m)  Wt 170 lb (77.1 kg)  SpO2 99%  Breastfeeding? No  BMI 29.18 kg/m2  Body mass index is 29.18 kg/(m^2).  Physical Exam   Constitutional: She appears well-developed and well-nourished.   Cardiovascular: Normal rate, regular rhythm, S1 normal, S2 normal and normal heart sounds.    No murmur heard.  Pulmonary/Chest: Effort normal and breath sounds normal. No respiratory distress. She has no wheezes. She has no rhonchi. She has no rales.   Musculoskeletal:   Left elbow has tenderness to palpation along the proximal end of the brachioradialis tendon just distal to the lateral epicondyle.  There is minimal tenderness noted directly over the lateral epicondyle.  Pain along the elbow in this area described exacerbated with firm grasp and pronation/supination.  No obvious areas of deformity, bruising, or skin changes in the left forearm or hand or wrist.   Neurological: She is alert.       ASSESSMENT/PLAN:       ICD-10-CM    1. Left tennis elbow M77.12 PHYSICAL THERAPY REFERRAL     meloxicam (MOBIC) 15 MG tablet     PLAN:  1.  We thoroughly discussed all options for management of her severe left tennis elbow.  First, I suggested that she take meloxicam 15 mg daily for the next 2 weeks along with starting physical therapy.  She states that she will take meloxicam first and then see if this improves it before she starts therapy.  If therapy does not begin to improve symptoms after a few visits, or if it is not completely resolved after a month or so of visits, I recommended that the next option would be consideration for corticosteroid injection.  Patient understands " the progression of treatment plan and will follow up with me as needed.    Kevin Gomez MD   [facility]

## 2018-02-16 NOTE — NURSING NOTE
"Chief Complaint   Patient presents with     Elbow Pain     left tennis elbow. 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 getting worse.        Initial /66  Pulse 110  Temp 98.3  F (36.8  C) (Temporal)  Resp 20  Ht 5' 4\" (1.626 m)  Wt 170 lb (77.1 kg)  SpO2 99%  Breastfeeding? No  BMI 29.18 kg/m2 Estimated body mass index is 29.18 kg/(m^2) as calculated from the following:    Height as of this encounter: 5' 4\" (1.626 m).    Weight as of this encounter: 170 lb (77.1 kg).  Medication Reconciliation: complete    "

## 2018-02-19 ENCOUNTER — TELEPHONE (OUTPATIENT)
Dept: FAMILY MEDICINE | Facility: CLINIC | Age: 38
End: 2018-02-19

## 2018-02-19 DIAGNOSIS — M25.522 LEFT ELBOW PAIN: Primary | ICD-10-CM

## 2018-02-19 NOTE — TELEPHONE ENCOUNTER
----- Message from Kevin Gomez MD sent at 2/19/2018  3:14 PM CST -----  Regarding: RE: requesting change to order  Sounds good.  Not sure what I was thinking.  I will have my  take care of this.    Kevin    ----- Message -----     From: Jayna Duran     Sent: 2/19/2018   2:18 PM       To: Kevin Gomez MD  Subject: requesting change to order                       Kevin Gomez MD,    Patient has an order for PT for tennis elbow, however typically this joint is treated by an OT not a PT. If appropriate can you please change this to and OT order so we can get this patient scheduled in accordingly?    Thank you!  Jayna  [facility] central scheduling

## 2018-03-15 ENCOUNTER — HOSPITAL ENCOUNTER (INPATIENT)
Facility: CLINIC | Age: 38
LOS: 4 days | Discharge: LEFT BEFORE DISCHARGE | End: 2018-03-19
Attending: PSYCHIATRY & NEUROLOGY | Admitting: PSYCHIATRY & NEUROLOGY
Payer: COMMERCIAL

## 2018-03-15 ENCOUNTER — HOSPITAL ENCOUNTER (EMERGENCY)
Facility: CLINIC | Age: 38
Discharge: ANOTHER HOSPITAL | End: 2018-03-15
Attending: EMERGENCY MEDICINE | Admitting: EMERGENCY MEDICINE
Payer: COMMERCIAL

## 2018-03-15 ENCOUNTER — APPOINTMENT (OUTPATIENT)
Dept: CT IMAGING | Facility: CLINIC | Age: 38
End: 2018-03-15
Attending: EMERGENCY MEDICINE
Payer: COMMERCIAL

## 2018-03-15 VITALS
DIASTOLIC BLOOD PRESSURE: 82 MMHG | TEMPERATURE: 97.6 F | OXYGEN SATURATION: 100 % | BODY MASS INDEX: 29.18 KG/M2 | WEIGHT: 170 LBS | SYSTOLIC BLOOD PRESSURE: 146 MMHG | RESPIRATION RATE: 14 BRPM

## 2018-03-15 DIAGNOSIS — F31.2 BIPOLAR AFFECTIVE DISORDER, CURRENTLY MANIC, SEVERE, WITH PSYCHOTIC FEATURES (H): Primary | ICD-10-CM

## 2018-03-15 DIAGNOSIS — F29 PSYCHOSIS, UNSPECIFIED PSYCHOSIS TYPE (H): ICD-10-CM

## 2018-03-15 PROBLEM — F31.89: Status: ACTIVE | Noted: 2018-03-15

## 2018-03-15 LAB
AMPHETAMINES UR QL: ABNORMAL NG/ML
ANION GAP SERPL CALCULATED.3IONS-SCNC: 12 MMOL/L (ref 3–14)
APAP SERPL-MCNC: <2 MG/L (ref 10–20)
BARBITURATES UR QL SCN: NOT DETECTED NG/ML
BASOPHILS # BLD AUTO: 0 10E9/L (ref 0–0.2)
BASOPHILS NFR BLD AUTO: 0.5 %
BENZODIAZ UR QL SCN: NOT DETECTED NG/ML
BUN SERPL-MCNC: 8 MG/DL (ref 7–30)
BUPRENORPHINE UR QL: NOT DETECTED NG/ML
CALCIUM SERPL-MCNC: 8.6 MG/DL (ref 8.5–10.1)
CANNABINOIDS UR QL: NOT DETECTED NG/ML
CHLORIDE SERPL-SCNC: 104 MMOL/L (ref 94–109)
CO2 SERPL-SCNC: 25 MMOL/L (ref 20–32)
COCAINE UR QL SCN: NOT DETECTED NG/ML
CREAT SERPL-MCNC: 0.72 MG/DL (ref 0.52–1.04)
D-METHAMPHET UR QL: NOT DETECTED NG/ML
DIFFERENTIAL METHOD BLD: NORMAL
EOSINOPHIL # BLD AUTO: 0.1 10E9/L (ref 0–0.7)
EOSINOPHIL NFR BLD AUTO: 1.2 %
ERYTHROCYTE [DISTWIDTH] IN BLOOD BY AUTOMATED COUNT: 13.4 % (ref 10–15)
ETHANOL SERPL-MCNC: <0.01 G/DL
GFR SERPL CREATININE-BSD FRML MDRD: >90 ML/MIN/1.7M2
GLUCOSE SERPL-MCNC: 67 MG/DL (ref 70–99)
HCG UR QL: NEGATIVE
HCT VFR BLD AUTO: 42.8 % (ref 35–47)
HGB BLD-MCNC: 13.6 G/DL (ref 11.7–15.7)
IMM GRANULOCYTES # BLD: 0 10E9/L (ref 0–0.4)
IMM GRANULOCYTES NFR BLD: 0.2 %
LYMPHOCYTES # BLD AUTO: 2 10E9/L (ref 0.8–5.3)
LYMPHOCYTES NFR BLD AUTO: 30.2 %
MCH RBC QN AUTO: 29.2 PG (ref 26.5–33)
MCHC RBC AUTO-ENTMCNC: 31.8 G/DL (ref 31.5–36.5)
MCV RBC AUTO: 92 FL (ref 78–100)
METHADONE UR QL SCN: NOT DETECTED NG/ML
MONOCYTES # BLD AUTO: 0.6 10E9/L (ref 0–1.3)
MONOCYTES NFR BLD AUTO: 8.5 %
NEUTROPHILS # BLD AUTO: 3.9 10E9/L (ref 1.6–8.3)
NEUTROPHILS NFR BLD AUTO: 59.4 %
OPIATES UR QL SCN: NOT DETECTED NG/ML
OXYCODONE UR QL SCN: NOT DETECTED NG/ML
PCP UR QL SCN: NOT DETECTED NG/ML
PLATELET # BLD AUTO: 300 10E9/L (ref 150–450)
POTASSIUM SERPL-SCNC: 3.8 MMOL/L (ref 3.4–5.3)
PROPOXYPH UR QL: NOT DETECTED NG/ML
RBC # BLD AUTO: 4.65 10E12/L (ref 3.8–5.2)
SODIUM SERPL-SCNC: 141 MMOL/L (ref 133–144)
TRICYCLICS UR QL SCN: NOT DETECTED NG/ML
TSH SERPL DL<=0.005 MIU/L-ACNC: 1.17 MU/L (ref 0.4–4)
WBC # BLD AUTO: 6.5 10E9/L (ref 4–11)

## 2018-03-15 PROCEDURE — 84443 ASSAY THYROID STIM HORMONE: CPT | Performed by: EMERGENCY MEDICINE

## 2018-03-15 PROCEDURE — 99285 EMERGENCY DEPT VISIT HI MDM: CPT | Mod: 25 | Performed by: EMERGENCY MEDICINE

## 2018-03-15 PROCEDURE — 80329 ANALGESICS NON-OPIOID 1 OR 2: CPT | Performed by: EMERGENCY MEDICINE

## 2018-03-15 PROCEDURE — 25000132 ZZH RX MED GY IP 250 OP 250 PS 637: Performed by: EMERGENCY MEDICINE

## 2018-03-15 PROCEDURE — 85025 COMPLETE CBC W/AUTO DIFF WBC: CPT | Performed by: EMERGENCY MEDICINE

## 2018-03-15 PROCEDURE — 25000128 H RX IP 250 OP 636: Performed by: EMERGENCY MEDICINE

## 2018-03-15 PROCEDURE — 90791 PSYCH DIAGNOSTIC EVALUATION: CPT

## 2018-03-15 PROCEDURE — 12400001 ZZH R&B MH UMMC

## 2018-03-15 PROCEDURE — 70450 CT HEAD/BRAIN W/O DYE: CPT

## 2018-03-15 PROCEDURE — 96372 THER/PROPH/DIAG INJ SC/IM: CPT | Performed by: EMERGENCY MEDICINE

## 2018-03-15 PROCEDURE — 80320 DRUG SCREEN QUANTALCOHOLS: CPT | Performed by: EMERGENCY MEDICINE

## 2018-03-15 PROCEDURE — 80048 BASIC METABOLIC PNL TOTAL CA: CPT | Performed by: EMERGENCY MEDICINE

## 2018-03-15 RX ORDER — LORAZEPAM 2 MG/ML
1 INJECTION INTRAMUSCULAR ONCE
Status: COMPLETED | OUTPATIENT
Start: 2018-03-15 | End: 2018-03-15

## 2018-03-15 RX ORDER — DIPHENHYDRAMINE HYDROCHLORIDE 50 MG/ML
50 INJECTION INTRAMUSCULAR; INTRAVENOUS ONCE
Status: COMPLETED | OUTPATIENT
Start: 2018-03-15 | End: 2018-03-15

## 2018-03-15 RX ORDER — ACETAMINOPHEN 325 MG/1
650 TABLET ORAL EVERY 4 HOURS PRN
Status: DISCONTINUED | OUTPATIENT
Start: 2018-03-15 | End: 2018-03-19 | Stop reason: HOSPADM

## 2018-03-15 RX ORDER — OLANZAPINE 2.5 MG/1
10 TABLET, FILM COATED ORAL ONCE
Status: COMPLETED | OUTPATIENT
Start: 2018-03-15 | End: 2018-03-15

## 2018-03-15 RX ORDER — HYDROXYZINE HYDROCHLORIDE 25 MG/1
25 TABLET, FILM COATED ORAL EVERY 4 HOURS PRN
Status: DISCONTINUED | OUTPATIENT
Start: 2018-03-15 | End: 2018-03-19 | Stop reason: HOSPADM

## 2018-03-15 RX ORDER — HALOPERIDOL 5 MG/ML
5 INJECTION INTRAMUSCULAR ONCE
Status: COMPLETED | OUTPATIENT
Start: 2018-03-15 | End: 2018-03-15

## 2018-03-15 RX ORDER — TRAZODONE HYDROCHLORIDE 50 MG/1
50 TABLET, FILM COATED ORAL
Status: DISCONTINUED | OUTPATIENT
Start: 2018-03-15 | End: 2018-03-19 | Stop reason: HOSPADM

## 2018-03-15 RX ORDER — MELOXICAM 7.5 MG/1
7.5 TABLET ORAL DAILY
Status: DISCONTINUED | OUTPATIENT
Start: 2018-03-16 | End: 2018-03-19

## 2018-03-15 RX ORDER — OLANZAPINE 10 MG/2ML
5 INJECTION, POWDER, FOR SOLUTION INTRAMUSCULAR
Status: DISCONTINUED | OUTPATIENT
Start: 2018-03-15 | End: 2018-03-19 | Stop reason: HOSPADM

## 2018-03-15 RX ORDER — OLANZAPINE 5 MG/1
5 TABLET ORAL
Status: DISCONTINUED | OUTPATIENT
Start: 2018-03-15 | End: 2018-03-19 | Stop reason: HOSPADM

## 2018-03-15 RX ADMIN — OLANZAPINE 10 MG: 2.5 TABLET, FILM COATED ORAL at 19:24

## 2018-03-15 RX ADMIN — LORAZEPAM 1 MG: 2 INJECTION INTRAMUSCULAR; INTRAVENOUS at 20:56

## 2018-03-15 RX ADMIN — HALOPERIDOL LACTATE 5 MG: 5 INJECTION, SOLUTION INTRAMUSCULAR at 20:55

## 2018-03-15 RX ADMIN — DIPHENHYDRAMINE HYDROCHLORIDE 50 MG: 50 INJECTION, SOLUTION INTRAMUSCULAR; INTRAVENOUS at 20:56

## 2018-03-15 ASSESSMENT — ENCOUNTER SYMPTOMS
NERVOUS/ANXIOUS: 1
HYPERACTIVE: 1
HALLUCINATIONS: 0
DECREASED CONCENTRATION: 1

## 2018-03-15 NOTE — ED NOTES
Brought in by EMS in a very manic state. Hard to follow-thoughts jump from one to the next with out making sense

## 2018-03-15 NOTE — IP AVS SNAPSHOT
MRN:[id number]                      After Visit Summary   3/15/2018    Cecy Wilkerson    MRN: [id number]           Thank you!     Thank you for choosing [facility] for your care. Our goal is always to provide you with excellent care.        Patient Information     Date Of Birth          1980        Designated Caregiver       Most Recent Value    Caregiver    Will someone help with your care after discharge? yes    Name of designated caregiver Blayne Wilkerson    Phone number of caregiver [phone]     Caregiver address [address] [place] [address] [place] [phone]      About your hospital stay     You were admitted on:  March 15, 2018 You last received care in the:  [facility]    You were discharged on:  March 19, 2018       Who to Call     For medical emergencies, please call 911.  For non-urgent questions about your medical care, please call your primary care provider or clinic, [phone]          Attending Provider     Provider Specialty    David Rice MD Psychiatry       Primary Care Provider Office Phone # Fax #    Ratna ZULEMA Jacob PA-C [phone]      Further instructions from your care team        Behavioral Discharge Planning and Instructions      Summary:  You were admitted on 3/15/2018 due to bizarre behavior and nini. You were treated by Dr. David Rice MD and discharged on 3/19/18 from [facility]      Principal Diagnosis: Bipolar Disorder, Nini      Health Care Follow-up Appointments:   [facility]  [phone]  FAX: [phone] [facility] has a strict 24 hour cancellation policy  [address] [facility]   [place]  Medication management: Becki Tubbs Tuesday March 20th @ 7:00am  Therapy: Patricia Angela  Wednesday March 28th @ 7:00am    Please bring you insurance card and ID        Attend all scheduled appointments with your outpatient providers. Call at least 24 hours in advance if you need to reschedule an appointment to ensure continued access to  "your outpatient providers.   Major Treatments, Procedures and Findings:  You were provided with: a psychiatric assessment and medication evaluation and/or management    Symptoms to Report: feeling more aggressive, increased confusion, losing more sleep, mood getting worse or thoughts of suicide    Early warning signs can include: increased depression or anxiety sleep disturbances increased thoughts or behaviors of suicide or self-harm  increased unusual thinking, such as paranoia or hearing voices    Safety and Wellness:  Take all medicines as directed.  Make no changes unless your doctor suggests them.      Follow treatment recommendations.  Refrain from alcohol and non-prescribed drugs.  If there is a concern for safety, call 911.    Resources:   Crisis Intervention: 917.916.2066 or 811-922-5009 (TTY: 921.193.6491).  Call anytime for help.  [employer] [place] [employer] Mental Illness (www.[place].nathaniel.org): 817.159.8389 or 118-928-6710.  [facility] Mobile Crisis Response Team (CRT):  [phone] or [phone]     The treatment team has appreciated the opportunity to work with you and thank you for choosing the [facility].   If you have any questions or concerns our unit number is [phone].          Pending Results     No orders found from 3/13/2018 to 3/16/2018.            Statement of Approval     Ordered          03/19/18 1212  I have reviewed and agree with all the recommendations and orders detailed in this document.  EFFECTIVE NOW     Approved and electronically signed by:  David Rice MD             Admission Information     Date & Time Provider Department Dept. Phone    3/15/2018 David Rice MD  [facility] [phone]      Your Vitals Were     Blood Pressure Pulse Temperature Respirations Height Weight    119/69 78 98.7  F (37.1  C) (Oral) 16 1.676 m (5' 6\") 72.6 kg (160 lb)    Last Period BMI (Body Mass Index)             "    03/12/2018 25.82 kg/m2          Fant[employer] Shopper Information     [employer]pper gives you secure access to your electronic health record. If you see a primary care provider, you can also send messages to your care team and make appointments. If you have questions, please call your primary care clinic.  If you do not have a primary care provider, please call [phone] and they will assist you.        Care EveryWhere ID     This is your Care EveryWhere ID. This could be used by other organizations to access your [facility] medical records  [id number]        Equal Access to Services     [facility] Center[facility]TH[facility] : Hadii jeremy alcantar hadmichelle Soaspen, waaxda luqadaha, qaybta kaalmada adeegyakelby, alexsandra grijalva . So [facility] [phone].    ATENCIÓN: Si habla español, tiene a worley disposición servicios gratuitos de asistencia lingüística. Llame al [phone].    We comply with applicable federal civil rights laws and [place] laws. We do not discriminate on the basis of race, color, national origin, age, disability, sex, sexual orientation, or gender identity.               Review of your medicines      START taking        Dose / Directions    OLANZapine 5 MG tablet   Commonly known as:  zyPREXA   Used for:  Bipolar affective disorder, currently manic, severe, with psychotic features (H)        Dose:  5 mg   Take 1 tablet (5 mg) by mouth At Bedtime   Quantity:  30 tablet   Refills:  1         CONTINUE these medicines which have NOT CHANGED        Dose / Directions    meloxicam 15 MG tablet   Commonly known as:  MOBIC   Used for:  Left tennis elbow        Dose:  15 mg   Take 1 tablet (15 mg) by mouth daily   Quantity:  30 tablet   Refills:  1         STOP taking     amphetamine-dextroamphetamine 30 MG per 24 hr capsule   Commonly known as:  ADDERALL XR                Where to get your medicines      [facility] [place] [facility] [address], [facility]  [address]     Phone:  [phone]     OLANZapine 5 MG tablet                Protect others around you: Learn how to safely use, store and throw away your medicines at www.disposemymeds.org.             Medication List: This is a list of all your medications and when to take them. Check marks below indicate your daily home schedule. Keep this list as a reference.      Medications           Morning Afternoon Evening Bedtime As Needed    meloxicam 15 MG tablet   Commonly known as:  MOBIC   Take 1 tablet (15 mg) by mouth daily   Last time this was given:  7.5 mg on 3/19/2018  8:33 AM                                OLANZapine 5 MG tablet   Commonly known as:  zyPREXA   Take 1 tablet (5 mg) by mouth At Bedtime   Last time this was given:  5 mg on 3/18/2018  7:23 PM

## 2018-03-15 NOTE — ED PROVIDER NOTES
"  History     Chief Complaint   Patient presents with     Anxiety     The history is provided by the patient.     Cecy Wilkerson is a 38 year old female who presents to the emergency department via EMS due to anxiety. Patient was unable to stay on one topic. 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 She talked about this multiple times. She then went on to talk about a vacation to [place] with her loved ones. Patient stated that \"this is a stall tactic\" so it is a surprize when she becomes rich. Patient stated that she would like a CT to figure out what is wrong with her. When I asked why she was here, she was unclear of how she was brought in by EMS or who called them. No voices or hallucinations. No thoughts of self harm or homicide. She denies taking her medications for ADHD because she does not like the way she fells. Patient also went from very happy to tearful when talking about getting help.    In discussing this with her family it sounds like she has progressively had problems with abnormal behavior over the last 10 years.  She has never been quite like this.  She does have a history of head injury.  There is a family history of brain tumor so they wonder if there is something there.  No recent history of trauma.  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  She was home when the police and paramedics arrived.  Her  had arrived home to find all of the first responders there.    Problem List:    Patient Active Problem List    Diagnosis Date Noted     Attention deficit hyperactivity disorder (ADHD), combined type 10/29/2015     Priority: Medium     External hemorrhoids 05/19/2015     Priority: Medium     Generalized hyperhidrosis 02/11/2015     Priority: Medium     Generalized anxiety disorder 10/09/2014     Priority: Medium     " Diagnosis updated by automated process. Provider to review and confirm.       H/O:  2010     Priority: Medium     CARDIOVASCULAR SCREENING; LDL GOAL LESS THAN 160 10/31/2010     Priority: Medium        Past Medical History:    Past Medical History:   Diagnosis Date     Anxiety      Infectious mononucleosis      Presence of intrauterine contraceptive device 11     Varicella without mention of complication        Past Surgical History:    Past Surgical History:   Procedure Laterality Date      SECTION  12/15/2010     SECTION performed by ROSA HYMAN at  [facility]     COLONOSCOPY N/A 2015    Procedure: COMBINED COLONOSCOPY, SINGLE OR MULTIPLE BIOPSY/POLYPECTOMY BY BIOPSY;  Surgeon: Zenon Jay MD;  Location:  GI     ESOPHAGOSCOPY, GASTROSCOPY, DUODENOSCOPY (EGD), COMBINED N/A 2015    Procedure: COMBINED ESOPHAGOSCOPY, GASTROSCOPY, DUODENOSCOPY (EGD), BIOPSY SINGLE OR MULTIPLE;  Surgeon: Zenon Jay MD;  Location:  GI     EXCISE LESN NECK/CHEST,SUBCUTAN  childhood    remove some lumps from the neck as a child     HEMORRHOIDECTOMY EXTERNAL N/A 2015    Procedure: HEMORRHOIDECTOMY EXTERNAL;  Surgeon: Jose Potter MD;  Location:  OR       Family History:    Family History   Problem Relation Age of Onset     Allergies Mother      bees and grass,pollens     Respiratory Mother      asthma     Alcohol/Drug Father      Simone's father     Alcohol/Drug Sister      Simone's sister     Alcohol/Drug Brother      Simone's brother     Allergies Brother      Grass and pollens.     DIABETES Maternal Grandmother      Adult onset     HEART DISEASE Maternal Grandmother      Hypertension Maternal Grandmother      Lipids Maternal Grandmother      Neurologic Disorder Brother      Respiratory Brother      asthma     CEREBROVASCULAR DISEASE Paternal Grandmother      CVa age 35     Obesity Maternal Aunt      Obesity Maternal Uncle      GASTROINTESTINAL DISEASE Other       Crohn's first cousin       Social History:  Marital Status:   [2]  Social History   Substance Use Topics     Smoking status: Former Smoker     Packs/day: 1.00     Years: 3.00     Quit date: 5/1/2005     Smokeless tobacco: Never Used     Alcohol use Yes      Comment: 1 drink every 2 weeks or so        Medications:      meloxicam (MOBIC) 15 MG tablet   amphetamine-dextroamphetamine (ADDERALL XR) 30 MG per 24 hr capsule         Review of Systems   Psychiatric/Behavioral: Positive for decreased concentration. Negative for hallucinations and self-injury. The patient is nervous/anxious and is hyperactive.    All other systems reviewed and are negative.      Physical Exam   BP: 147/90  Heart Rate: 84  Temp: 97.6  F (36.4  C)  Resp: 14  Weight: 77.1 kg (170 lb)  SpO2: 100 %      Physical Exam  Vitals reviewed  NAD  HEENT:NC/Atraumatic, EOMI, anicteric, PERRL and symmetric, mucous membranes moist, Ext ears nl, OP clear  Chest/PULM: atraumatic, NT to palpation, no resp distress, CTA Bilaterally, no wheezes, crackles.  CV: rrr without m/r/g S1S2, peripheral pulses normal  ABD: soft NT, non distended, no guarding or rebound  Extremities: atraumatic, no edema, full rom  : deferred  Neuro: CN 2-12 grossly intact, motor and sensation grossly intact  Psych: Abnormal train of thought, unable to focus or answer questions directly.  Denies suicidal ideation or homicidal ideation.  No hallucinations visual or audio.      ED Course     ED Course     Procedures                   Results for orders placed or performed during the hospital encounter of 03/15/18 (from the past 24 hour(s))   Urine Drugs of Abuse Screen Panel 13   Result Value Ref Range    Cannabinoids (50-wfy-7-carboxy-9-THC) Not Detected NDET^Not Detected ng/mL    Phencyclidine (Phencyclidine) Not Detected NDET^Not Detected ng/mL    Cocaine (Benzoylecgonine) Not Detected NDET^Not Detected ng/mL    Methamphetamine (d-Methamphetamine) Not Detected NDET^Not Detected  ng/mL    Opiates (Morphine) Not Detected NDET^Not Detected ng/mL    Amphetamine (d-Amphetamine) Detected, Abnormal Result (A) NDET^Not Detected ng/mL    Benzodiazepines (Nordiazepam) Not Detected NDET^Not Detected ng/mL    Tricyclic Antidepressants (Desipramine) Not Detected NDET^Not Detected ng/mL    Methadone (Methadone) Not Detected NDET^Not Detected ng/mL    Barbiturates (Butalbital) Not Detected NDET^Not Detected ng/mL    Oxycodone (Oxycodone) Not Detected NDET^Not Detected ng/mL    Propoxyphene (Norpropoxyphene) Not Detected NDET^Not Detected ng/mL    Buprenorphine (Buprenorphine) Not Detected NDET^Not Detected ng/mL   HCG qualitative urine   Result Value Ref Range    HCG Qual Urine Negative NEG^Negative   CBC with platelets differential   Result Value Ref Range    WBC 6.5 4.0 - 11.0 10e9/L    RBC Count 4.65 3.8 - 5.2 10e12/L    Hemoglobin 13.6 11.7 - 15.7 g/dL    Hematocrit 42.8 35.0 - 47.0 %    MCV 92 78 - 100 fl    MCH 29.2 26.5 - 33.0 pg    MCHC 31.8 31.5 - 36.5 g/dL    RDW 13.4 10.0 - 15.0 %    Platelet Count 300 150 - 450 10e9/L    Diff Method Automated Method     % Neutrophils 59.4 %    % Lymphocytes 30.2 %    % Monocytes 8.5 %    % Eosinophils 1.2 %    % Basophils 0.5 %    % Immature Granulocytes 0.2 %    Absolute Neutrophil 3.9 1.6 - 8.3 10e9/L    Absolute Lymphocytes 2.0 0.8 - 5.3 10e9/L    Absolute Monocytes 0.6 0.0 - 1.3 10e9/L    Absolute Eosinophils 0.1 0.0 - 0.7 10e9/L    Absolute Basophils 0.0 0.0 - 0.2 10e9/L    Abs Immature Granulocytes 0.0 0 - 0.4 10e9/L   Basic metabolic panel   Result Value Ref Range    Sodium 141 133 - 144 mmol/L    Potassium 3.8 3.4 - 5.3 mmol/L    Chloride 104 94 - 109 mmol/L    Carbon Dioxide 25 20 - 32 mmol/L    Anion Gap 12 3 - 14 mmol/L    Glucose 67 (L) 70 - 99 mg/dL    Urea Nitrogen 8 7 - 30 mg/dL    Creatinine 0.72 0.52 - 1.04 mg/dL    GFR Estimate >90 >60 mL/min/1.7m2    GFR Estimate If Black >90 >60 mL/min/1.7m2    Calcium 8.6 8.5 - 10.1 mg/dL   TSH   Result  Value Ref Range    TSH 1.17 0.40 - 4.00 mU/L   Acetaminophen level   Result Value Ref Range    Acetaminophen Level <2 mg/L   Alcohol level blood   Result Value Ref Range    Ethanol g/dL <0.01 <0.01 g/dL   Head CT w/o contrast    Narrative    CT HEAD WITHOUT CONTRAST  3/15/2018 7:22 PM    HISTORY: Altered mental status.     TECHNIQUE: Scans were obtained through the head without IV contrast.   Radiation dose for this scan was reduced using automated exposure  control, adjustment of the mA and/or kV according to patient size, or  iterative reconstruction technique.    COMPARISON: None.    FINDINGS: No hemorrhage, mass lesion, or focal area of acute  infarction identified. Paranasal sinuses are normal. No bony  abnormality.      Impression    IMPRESSION: Negative CT scan of the head.    LEEANN MAHAN MD       Medications   OLANZapine (zyPREXA) tablet 10 mg (10 mg Oral Given 3/15/18 1924)     Patient placed on a 72 hr hold.    Pt accepted by [facility] for psychosis    Assessments & Plan (with Medical Decision Making)     I have reviewed the nursing notes.    I have reviewed the plan with the patient and family. Family agrees.   A: psychotic behavior  P: transfer to [facility] for further care    New Prescriptions    No medications on file       Final diagnoses:   None     This document serves as a record of services personally performed by Sven Ivory MD. It was created on their behalf by Demetra Beyer, a trained medical scribe. The creation of this record is based on the provider's personal observations and the statements of the patient. This document has been checked and approved by the attending provider.  Note: Chart documentation done in part with Dragon Voice Recognition software. Although reviewed after completion, some word and grammatical errors may remain.  3/15/2018   [facility] EMERGENCY DEPARTMENT     Sven Ivory MD  03/15/18 2016

## 2018-03-15 NOTE — IP AVS SNAPSHOT
[address] [address]    Phone:  [phone]                                       After Visit Summary   3/15/2018    Cecy Wilkerson    MRN: [id number]           After Visit Summary Signature Page     I have received my discharge instructions, and my questions have been answered. I have discussed any challenges I see with this plan with the nurse or doctor.    ..........................................................................................................................................  Patient/Patient Representative Signature      ..........................................................................................................................................  Patient Representative Print Name and Relationship to Patient    ..................................................               ................................................  Date                                            Time    ..........................................................................................................................................  Reviewed by Signature/Title    ...................................................              ..............................................  Date                                                            Time

## 2018-03-16 PROCEDURE — 25000132 ZZH RX MED GY IP 250 OP 250 PS 637: Performed by: PSYCHIATRY & NEUROLOGY

## 2018-03-16 PROCEDURE — 12400001 ZZH R&B MH UMMC

## 2018-03-16 RX ORDER — OLANZAPINE 5 MG/1
5 TABLET ORAL AT BEDTIME
Status: DISCONTINUED | OUTPATIENT
Start: 2018-03-16 | End: 2018-03-19 | Stop reason: HOSPADM

## 2018-03-16 RX ADMIN — TRAZODONE HYDROCHLORIDE 50 MG: 50 TABLET ORAL at 22:34

## 2018-03-16 RX ADMIN — MELOXICAM 7.5 MG: 7.5 TABLET ORAL at 11:19

## 2018-03-16 RX ADMIN — OLANZAPINE 5 MG: 5 TABLET ORAL at 22:34

## 2018-03-16 ASSESSMENT — ACTIVITIES OF DAILY LIVING (ADL)
DRESS: 0-->INDEPENDENT
SWALLOWING: 0-->SWALLOWS FOODS/LIQUIDS WITHOUT DIFFICULTY
DRESS: SCRUBS (BEHAVIORAL HEALTH);INDEPENDENT
ORAL_HYGIENE: INDEPENDENT
AMBULATION: 0-->INDEPENDENT
FALL_HISTORY_WITHIN_LAST_SIX_MONTHS: NO
COGNITION: 0 - NO COGNITION ISSUES REPORTED
RETIRED_EATING: 0-->INDEPENDENT
GROOMING: INDEPENDENT
TOILETING: 0-->INDEPENDENT
TRANSFERRING: 0-->INDEPENDENT
RETIRED_COMMUNICATION: 0-->UNDERSTANDS/COMMUNICATES WITHOUT DIFFICULTY
BATHING: 0-->INDEPENDENT
LAUNDRY: UNABLE TO COMPLETE

## 2018-03-16 NOTE — PROGRESS NOTES
03/15/18 4231   Patient Belongings   Did you bring any home meds/supplements to the hospital?  No   Patient Belongings other (see comments);clothing   Disposition of Belongings Locker   Belongings Search Yes   Clothing Search Yes   Second Staff MILTON Wong   Patient Belongings  -Clothing - tank, zip-up hoodie w/ strings, jeans, bra, socks, cap, and belt    No valuables brought on unit.  took other items.    A               Admission:  I am responsible for any personal items that are not sent to the safe or pharmacy.  [facility] is not responsible for loss, theft or damage of any property in my possession.    Signature:  _________________________________ Date: _______  Time: _____                                              Staff Signature:  ____________________________ Date: ________  Time: _____      2nd Staff person, if patient is unable/unwilling to sign:    Signature: ________________________________ Date: ________  Time: _____     Discharge:  [facility] has returned all of my personal belongings:    Signature: _________________________________ Date: ________  Time: _____                                          Staff Signature:  ____________________________ Date: ________  Time: _____

## 2018-03-16 NOTE — ED NOTES
Pt yelling out and threats to family. Becoming more agitated with plan to transfer. MD updated and medications administered prior to transfer.

## 2018-03-16 NOTE — H&P
"Admitted:     03/15/2018      The patient was seen for 70 minutes on 03/16/2018.  Greater than 50% of the time was spent in counseling and coordinating care, clarifying diagnostic prognostic issues and presence of support in community.      CHIEF COMPLAINT AND REASON FOR ADMISSION:  The patient is a 38-year-old   female, mother of 2, admitted through the emergency room via EMS due to severe agitation and bizarre behavior.  The patient was admitted on a 72-hour hold.      HISTORY OF PRESENT ILLNESS:  The patient presents as partially reliable historian.  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 Said that this idea was suggested to her by 1 woman. This woman she exchanged messages on [employer] and also talked on the phone. The patient also indicated that she realized for that she needed to study and talked to staff at the emergency room about her plans to go on a vacation to [place] with her loved ones.  She also voiced worries that her  was going to kidnap her 2 daughters and get them into prostitution ring and she went to school to  her girls and exhibited abnormal behavior so the principal from the school called Child Protective Services and the police.  The patient was at home when the police and paramedics arrived.  Her  had arrived home to find all of the first responders there.  The patient repeatedly denied auditory or visual hallucinations.  Admitted that her thoughts were racing.  She had quite a lot of ideas in her head and she needed people to write them down for her. Said that she was supposed to take Adderall, unclear if she was taking it and if she was how often and how much.  According to her mother, the patient took only a few tablets of " Adderall, but she did test positively for amphetamines on her urine drug screen.      PAST PSYCHIATRIC HISTORY:  The patient was diagnosed with attention deficit hyperactivity disorder also in the past with anxiety and was prescribed Adderall-XR 30 mg daily.  The patient's mother reported that she had injury 15 years ago where she did not remember having a baby and kept repeating the same thing for 3 days and she was normal for 5 years, then progressively has gotten worse in the past 10 years.  Mother also said that the patient had episodes of staying awake for a few nights in a row, but no extreme behaviors such as now.      PAST MEDICAL HISTORY:  Significant for head trauma. The patient's prescriber is Ratna Jacob physician assistant.      AT HOME MEDICATIONS:   1.  Meloxicam 15 mg daily.   2.  Adderall-XR 30 mg daily.      ALLERGIES:  THE PATIENT DENIED ANY KNOWN MEDICAL ALLERGIES.        Has a history of varicella, infectious mononucleosis, history of  section, esophagogastroduodenoscopy.      FAMILY AND SOCIAL HISTORY:  Reports that quite significant history of alcohol and drug use in many family members including father, sister and brother.  She said that she believes that her mother is bipolar because significant mood swings.  The patient is a .  She has 2 daughters who are 7 and 16 years old.      PHYSICAL EXAMINATION and 12-point review of systems, please refer to Dr. Sven Ivory's note from 03/15/2018. I reviewed this note and agree with it.       VITAL SIGNS:  Temperature 98.4, heart rate 85, blood pressure 135/108.      MENTAL STATUS EXAMINATION:  The patient is a  female dressed in hospital garbs looking according to stated age, somewhat tired, speech is fast, but not pressured.  She was able to slow down.  She presented with quite labile mood and became tearful on more than one occasion now states that she just needed to fall sleep and wake up and wanted people to  help get things  organized.  She denied presence of suicidal or homicidal thoughts, stating that she said that she thought that her  was going to kidnap her daughters, although she realized that it was unlikely.  Ability to focus and concentrate during the interview were poor.  She frequently asked to repeat questions.  Thought processes were loose and at times illogical.  Insight and judgment were moderately impaired.  The patient denied auditory or visual hallucinations, but voiced clearly manic grandiose delusional ideas, see discussion above.      IMPRESSION:     Mood disorder with manic features.   Rule out bipolar affective disorder.   Rule out substance (stimulant induced mood disorder with manic features).   Attention deficit hyperactivity disorder.      TREATMENT PLAN:  The patient's blood work showed presence of amphetamines, but not other illicit substances.  CT of head without contrast was negative.  There is a possibility that the patient's manic/psychotic symptoms are due to overusing her Adderall. It is also possible that she has a new onset of bipolar affective disorder.  I suggested her to be started on 5 mg of Zyprexa.  Hopefully her condition would improve in a couple of days of hospitalization to the point that she could be discharged home.  After discussion of risks and benefits the patient agreed with this suggestion. She presents as irrational about taking her kids from school. Her insight and judgment were still limited and I felt appropriate to continue 72-hour hold.          MERISSA SCHULZ MD             D: 2018   T: 2018   MT: DANA      Name:     SUREKHA JACKSON   MRN:      [id number]        Account:      [id number]   :      1980        Admitted:     03/15/2018                   Document: [id number]

## 2018-03-16 NOTE — PROGRESS NOTES
Initial Psychosocial Assessment    I have reviewed the chart, met with the patient, and developed Care Plan. Information for assessment was obtained from: Pt, medical record      Presenting Problem: Pt transferred from [facility] for disorganization, delusions and paranoia. According to records, pt went to her daughters school and principal noticed pt was behaving in an erratic manner. The Principal called police.    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

## 2018-03-16 NOTE — PLAN OF CARE
Problem: Patient Care Overview  Goal: Team Discussion  Team Plan:   BEHAVIORAL TEAM DISCUSSION    Participants: Dr. Rice, CTC Elise Mackey, RN Na Sutton  Progress: new admission  Continued Stay Criteria/Rationale: disorganized, delusional  Medical/Physical: none  Precautions:   Behavioral Orders   Procedures     Code 1 - Restrict to Unit     Routine Programming     As clinically indicated     Status 15     Every 15 minutes.     Plan: manage meds per psychiatry, staff to observe for behavior changes, CTC to contact family for additional information  Rationale for change in precautions or plan: no change

## 2018-03-16 NOTE — PLAN OF CARE
Problem: Manic Symptoms  Goal: Manic Symptoms  Signs and symptoms of listed problems will be absent or manageable.   Outcome: Reddy Sam slept most of the morning until awakened by her doctor.  She is hungry and wants to eat.  She denies SI.  She doesn't think she needs to be in the hospital.  Filled out her menu.

## 2018-03-16 NOTE — PROGRESS NOTES
Pt was brought in to [facility] by police after pt showed up at her daughters school acting erratic and removed them from school. Per report pt was delusional that her  was sex-trafficking their daughters. Pt was also paranoid that the FBI is after her. Pt has not been sleeping and according to report was having grandiose thought about owning a [employer] business.  Pt was given a Zyprexa at the ER before being transported to [facility]. On arrival patient was very sleepy and didn't want to do the interview. Pt denied SI/SIB. Pt requested to be left alone to sleep. Amphetamine was detected on Utox. Pt  Is on 72 hour hold starting from 03/15/18 at 1935 to 3/20/18 at 1935.

## 2018-03-17 PROCEDURE — 25000132 ZZH RX MED GY IP 250 OP 250 PS 637: Performed by: PSYCHIATRY & NEUROLOGY

## 2018-03-17 PROCEDURE — 12400001 ZZH R&B MH UMMC

## 2018-03-17 RX ADMIN — OLANZAPINE 5 MG: 5 TABLET ORAL at 11:41

## 2018-03-17 RX ADMIN — TRAZODONE HYDROCHLORIDE 50 MG: 50 TABLET ORAL at 22:02

## 2018-03-17 RX ADMIN — HYDROXYZINE HYDROCHLORIDE 25 MG: 25 TABLET ORAL at 11:41

## 2018-03-17 RX ADMIN — OLANZAPINE 5 MG: 5 TABLET ORAL at 20:25

## 2018-03-17 RX ADMIN — MELOXICAM 7.5 MG: 7.5 TABLET ORAL at 08:31

## 2018-03-17 RX ADMIN — TRAZODONE HYDROCHLORIDE 50 MG: 50 TABLET ORAL at 20:25

## 2018-03-17 ASSESSMENT — ACTIVITIES OF DAILY LIVING (ADL)
HYGIENE/GROOMING: INDEPENDENT
DRESS: INDEPENDENT
LAUNDRY: WITH SUPERVISION
ORAL_HYGIENE: INDEPENDENT

## 2018-03-17 NOTE — PROGRESS NOTES
PT continues to present manic, needs much reassurance, have met with pt  one on one to explain her meds and reassure herx3.  Pt took prn with minimal effect, continues to ask to talk to  About getting a therapist, etc, pt manic, takes redirection well, continue to reassure pt.and give her activities to do such as draw, puzzle etc.

## 2018-03-17 NOTE — PROGRESS NOTES
03/17/18 1522   Behavioral Health   Hallucinations denies / not responding to hallucinations   Thinking distractable   Orientation person: oriented;place: oriented;date: oriented;time: oriented   Memory baseline memory   Insight admits / accepts   Judgement impaired   Eye Contact at examiner   Affect full range affect   Mood mood is calm   Physical Appearance/Attire neat   Hygiene well groomed   Suicidality other (see comments)  (Denied)   1. Wish to be Dead No   2. Non-Specific Active Suicidal Thoughts  No   Self Injury other (see comment)  (Denied)   Elopement (None observed)   Activity other (see comment);hyperactive (agitated, impulsive)  (Active in the milieu)   Speech coherent;clear   Medication Sensitivity no stated side effects   Psychomotor / Gait balanced;steady   Psycho Education   Type of Intervention 1:1 intervention   Response participates, initiates socially appropriate   Hours 0.5   Activities of Daily Living   Hygiene/Grooming independent   Oral Hygiene independent   Dress independent   Laundry with supervision   Room Organization independent     Pt was visible in the milieu for the majority of the shift. Pt was very social and active in the milieu. Pt attended and participated in community meeting and played games with peers. Pt was visited by her family during visiting hours. Pt was manic and hyper at times. Pt's goal is to rest and seek an outpatient professional help such as seeing a therapist, so she does not have a psychotic break again.

## 2018-03-17 NOTE — PLAN OF CARE
"Problem: Manic Symptoms  Goal: Manic Symptoms  Signs and symptoms of listed problems will be absent or manageable.   Outcome: Improving  Pt was visible in the milieu. Participated in group. Denies SI/SIB. Pt presents with full range affect. Pt states she is depressed \"4\" anxiety \"5\". Denies AH/VH.       "

## 2018-03-18 PROCEDURE — 25000132 ZZH RX MED GY IP 250 OP 250 PS 637: Performed by: PSYCHIATRY & NEUROLOGY

## 2018-03-18 PROCEDURE — 12400001 ZZH R&B MH UMMC

## 2018-03-18 RX ADMIN — TRAZODONE HYDROCHLORIDE 50 MG: 50 TABLET ORAL at 21:37

## 2018-03-18 RX ADMIN — OLANZAPINE 5 MG: 5 TABLET ORAL at 19:23

## 2018-03-18 RX ADMIN — OLANZAPINE 5 MG: 5 TABLET ORAL at 08:26

## 2018-03-18 RX ADMIN — TRAZODONE HYDROCHLORIDE 50 MG: 50 TABLET ORAL at 19:23

## 2018-03-18 RX ADMIN — HYDROXYZINE HYDROCHLORIDE 25 MG: 25 TABLET ORAL at 23:55

## 2018-03-18 RX ADMIN — MELOXICAM 7.5 MG: 7.5 TABLET ORAL at 08:26

## 2018-03-18 RX ADMIN — HYDROXYZINE HYDROCHLORIDE 25 MG: 25 TABLET ORAL at 20:18

## 2018-03-18 RX ADMIN — HYDROXYZINE HYDROCHLORIDE 25 MG: 25 TABLET ORAL at 00:08

## 2018-03-18 ASSESSMENT — ACTIVITIES OF DAILY LIVING (ADL)
ORAL_HYGIENE: INDEPENDENT
LAUNDRY: WITH SUPERVISION
HYGIENE/GROOMING: INDEPENDENT
DRESS: STREET CLOTHES

## 2018-03-18 NOTE — PLAN OF CARE
Problem: Manic Symptoms  Goal: Manic Symptoms  Signs and symptoms of listed problems will be absent or manageable.   Outcome: Improving  Pt continues hypomanic, is med compliant , is happy about getting a private room as she is sleep deprived.

## 2018-03-18 NOTE — PROGRESS NOTES
Pt continues to present hypomanic, and confused as to why she is in the hospital, pt asking to leave. Pt  and family here visiting and pt asked me to speak with them as to why she can't leave, pt c/o not being able to sleep in hospital, states she is a light sleeper. Due to pt nini and inability to sleep with room mate we were able to move pt into a private room which calmed her a lot and made her less anxious. Pt is med compliant and redirectable,poor insight, is agreeable to tx and wants help with getting outpt providers.

## 2018-03-19 VITALS
TEMPERATURE: 98.7 F | BODY MASS INDEX: 25.71 KG/M2 | RESPIRATION RATE: 16 BRPM | HEIGHT: 66 IN | HEART RATE: 78 BPM | WEIGHT: 160 LBS | DIASTOLIC BLOOD PRESSURE: 69 MMHG | SYSTOLIC BLOOD PRESSURE: 119 MMHG

## 2018-03-19 PROCEDURE — 25000132 ZZH RX MED GY IP 250 OP 250 PS 637: Performed by: PSYCHIATRY & NEUROLOGY

## 2018-03-19 PROCEDURE — 90853 GROUP PSYCHOTHERAPY: CPT

## 2018-03-19 PROCEDURE — 99207 ZZC CDG-CODE INCORRECT PER BILLING BASED ON TIME: CPT | Performed by: PSYCHIATRY & NEUROLOGY

## 2018-03-19 PROCEDURE — 99238 HOSP IP/OBS DSCHRG MGMT 30/<: CPT | Performed by: PSYCHIATRY & NEUROLOGY

## 2018-03-19 RX ORDER — MELOXICAM 7.5 MG/1
15 TABLET ORAL DAILY
Status: DISCONTINUED | OUTPATIENT
Start: 2018-03-20 | End: 2018-03-19 | Stop reason: HOSPADM

## 2018-03-19 RX ORDER — OLANZAPINE 5 MG/1
5 TABLET ORAL AT BEDTIME
Qty: 30 TABLET | Refills: 1 | Status: SHIPPED | OUTPATIENT
Start: 2018-03-19 | End: 2018-07-05

## 2018-03-19 RX ADMIN — MELOXICAM 7.5 MG: 7.5 TABLET ORAL at 08:33

## 2018-03-19 RX ADMIN — TRAZODONE HYDROCHLORIDE 50 MG: 50 TABLET ORAL at 03:02

## 2018-03-19 ASSESSMENT — ACTIVITIES OF DAILY LIVING (ADL)
DRESS: STREET CLOTHES;INDEPENDENT
ORAL_HYGIENE: INDEPENDENT
GROOMING: INDEPENDENT

## 2018-03-19 NOTE — PLAN OF CARE
Problem: General Plan of Care (Inpatient Behavioral)  Goal: Discharge Planning  Outcome: Adequate for Discharge Date Met: 03/19/18  Cecy has been discharged to home with her .  Her medications have been called to the [facility] pharmacy.  She denies being suicidal.  She was cooperative with all discharge procedures and signed her valuables form and discharge instruction form.

## 2018-03-19 NOTE — PROGRESS NOTES
03/19/18 1300   Occupational Therapy   Type of Intervention structured groups   Response Initiates, socially acceptable   Hours 1       Pt. attended and actively participated in an Occupational Therapy Life Skills session which focussed on exercise.  Pt. participated in a one mile walk and a discussion on the guidelines and  benefits of exercise.  Good psychomotor skills, pacing, coordination & ROM;  Talkative and bright during all group session.  Occasionally loose but able to self regulate appropriately.

## 2018-03-19 NOTE — DISCHARGE INSTRUCTIONS
Behavioral Discharge Planning and Instructions      Summary:  You were admitted on 3/15/2018 due to bizarre behavior and nini. You were treated by Dr. David Rice MD and discharged on 3/19/18 from [facility] Home      Principal Diagnosis: Bipolar Disorder, Nini      Health Care Follow-up Appointments:   [facility]  [phone]  FAX: [phone] [facility] has a strict 24 hour cancellation policy  [address]  Medication management: Becki Tubbs Tuesday March 20th @ 7:00am  Therapy: Patricia Angela  Wednesday March 28th @ 7:00am    Please bring you insurance card and ID        Attend all scheduled appointments with your outpatient providers. Call at least 24 hours in advance if you need to reschedule an appointment to ensure continued access to your outpatient providers.   Major Treatments, Procedures and Findings:  You were provided with: a psychiatric assessment and medication evaluation and/or management    Symptoms to Report: feeling more aggressive, increased confusion, losing more sleep, mood getting worse or thoughts of suicide    Early warning signs can include: increased depression or anxiety sleep disturbances increased thoughts or behaviors of suicide or self-harm  increased unusual thinking, such as paranoia or hearing voices    Safety and Wellness:  Take all medicines as directed.  Make no changes unless your doctor suggests them.      Follow treatment recommendations.  Refrain from alcohol and non-prescribed drugs.  If there is a concern for safety, call 911.    Resources:   Crisis Intervention: 221.261.2114 or 723-010-9053 (TTY: 488.956.1448).  Call anytime for help.  National [place] on Mental Illness (www.[place].nathaniel.org): 163.368.6545 or 845-730-2208.  [facility], and [facility] Mobile Crisis Response Team (CRT):  [phone] or [phone]     The treatment team has appreciated the opportunity to work with you and thank you  for choosing the [facility].   If you have any questions or concerns our unit number is [phone].

## 2018-03-19 NOTE — PLAN OF CARE
Problem: Manic Symptoms  Goal: Manic Symptoms  Signs and symptoms of listed problems will be absent or manageable.   Outcome: No Change  Cecy asked her  here for a meeting with Dr. Rice.  The trouble is that a meeting hadn't been scheduled.  The unit received calls about this from , cousin, and mother.  Cecy is saying no one communicated with her  when this nurse has spoken to him on the phone. as well as the  talked with him twice in the lobby.  The cousin insisted there was a DORENE for her but this RN and another staff were unable to find a DORENE.  Cousin was told we would communicate with one family member, mother.  If the cousin wanted information she would need to call mother.  Cecy felt staff was being inconsiderate of  by inviting him here and not meeting with him.  This RN pointed out that Cecy invited her  here for a meeting that hadn't been scheduled.  She seemed to have gotten her meeting with Dr. Rice confused with wanting a family meeting.

## 2018-03-19 NOTE — PROGRESS NOTES
Patient reports good mood, denies SI/SIB or psychotic symptoms. Patient states her only significant concern is lack of sleep, and she feels that she would sleep better at home than in the hospital. Patient was hyperactive with restless energy for much of the shift, repeatedly stating that she was going to bed but getting up a short time later. Patient was visible and social with full range affect.     03/18/18 2200   Behavioral Health   Hallucinations denies / not responding to hallucinations   Thinking distractable;poor concentration   Orientation person: oriented;place: oriented   Memory baseline memory   Insight poor   Judgement impaired   Eye Contact at examiner   Affect full range affect   Mood elated   Physical Appearance/Attire appears stated age;attire appropriate to age and situation   Hygiene well groomed   Suicidality other (see comments)  (Denies)   1. Wish to be Dead No   2. Non-Specific Active Suicidal Thoughts  No   Self Injury other (see comment)  (Denies)   Activity restless   Speech clear;coherent   Medication Sensitivity no stated side effects;no observed side effects   Psychomotor / Gait balanced;steady   Activities of Daily Living   Hygiene/Grooming independent   Oral Hygiene independent   Dress street clothes   Laundry with supervision   Room Organization independent

## 2018-03-20 ENCOUNTER — TELEPHONE (OUTPATIENT)
Dept: FAMILY MEDICINE | Facility: CLINIC | Age: 38
End: 2018-03-20

## 2018-03-20 DIAGNOSIS — Z86.39 HISTORY OF VITAMIN D DEFICIENCY: ICD-10-CM

## 2018-03-20 DIAGNOSIS — Z13.220 SCREENING, LIPID: ICD-10-CM

## 2018-03-20 DIAGNOSIS — Z13.1 DIABETES MELLITUS SCREENING: Primary | ICD-10-CM

## 2018-03-20 DIAGNOSIS — Z86.39 PERSONAL HISTORY OF NUTRITIONAL DEFICIENCY: ICD-10-CM

## 2018-03-20 DIAGNOSIS — Z13.29 SCREENING FOR ENDOCRINE DISORDER: ICD-10-CM

## 2018-03-20 LAB
ALBUMIN SERPL-MCNC: 3.8 G/DL (ref 3.4–5)
ALP SERPL-CCNC: 62 U/L (ref 40–150)
ALT SERPL W P-5'-P-CCNC: 19 U/L (ref 0–50)
ANION GAP SERPL CALCULATED.3IONS-SCNC: 8 MMOL/L (ref 3–14)
AST SERPL W P-5'-P-CCNC: 17 U/L (ref 0–45)
BASOPHILS # BLD AUTO: 0 10E9/L (ref 0–0.2)
BASOPHILS NFR BLD AUTO: 0.5 %
BILIRUB SERPL-MCNC: 0.3 MG/DL (ref 0.2–1.3)
BUN SERPL-MCNC: 7 MG/DL (ref 7–30)
CALCIUM SERPL-MCNC: 8.3 MG/DL (ref 8.5–10.1)
CHLORIDE SERPL-SCNC: 106 MMOL/L (ref 94–109)
CHOLEST SERPL-MCNC: 164 MG/DL
CO2 SERPL-SCNC: 28 MMOL/L (ref 20–32)
CREAT SERPL-MCNC: 0.66 MG/DL (ref 0.52–1.04)
DIFFERENTIAL METHOD BLD: NORMAL
EOSINOPHIL # BLD AUTO: 0.1 10E9/L (ref 0–0.7)
EOSINOPHIL NFR BLD AUTO: 1.8 %
ERYTHROCYTE [DISTWIDTH] IN BLOOD BY AUTOMATED COUNT: 13.8 % (ref 10–15)
GFR SERPL CREATININE-BSD FRML MDRD: >90 ML/MIN/1.7M2
GLUCOSE SERPL-MCNC: 103 MG/DL (ref 70–99)
HCT VFR BLD AUTO: 41.5 % (ref 35–47)
HDLC SERPL-MCNC: 60 MG/DL
HGB BLD-MCNC: 13.2 G/DL (ref 11.7–15.7)
IMM GRANULOCYTES # BLD: 0 10E9/L (ref 0–0.4)
IMM GRANULOCYTES NFR BLD: 0 %
LDLC SERPL CALC-MCNC: 93 MG/DL
LYMPHOCYTES # BLD AUTO: 1.8 10E9/L (ref 0.8–5.3)
LYMPHOCYTES NFR BLD AUTO: 41.4 %
MCH RBC QN AUTO: 29.5 PG (ref 26.5–33)
MCHC RBC AUTO-ENTMCNC: 31.8 G/DL (ref 31.5–36.5)
MCV RBC AUTO: 93 FL (ref 78–100)
MONOCYTES # BLD AUTO: 0.3 10E9/L (ref 0–1.3)
MONOCYTES NFR BLD AUTO: 7.8 %
NEUTROPHILS # BLD AUTO: 2.1 10E9/L (ref 1.6–8.3)
NEUTROPHILS NFR BLD AUTO: 48.5 %
NONHDLC SERPL-MCNC: 104 MG/DL
PLATELET # BLD AUTO: 266 10E9/L (ref 150–450)
POTASSIUM SERPL-SCNC: 4 MMOL/L (ref 3.4–5.3)
PROT SERPL-MCNC: 7.2 G/DL (ref 6.8–8.8)
RBC # BLD AUTO: 4.48 10E12/L (ref 3.8–5.2)
SODIUM SERPL-SCNC: 142 MMOL/L (ref 133–144)
T4 FREE SERPL-MCNC: 1.08 NG/DL (ref 0.76–1.46)
TRIGL SERPL-MCNC: 55 MG/DL
TSH SERPL DL<=0.005 MIU/L-ACNC: 0.44 MU/L (ref 0.4–4)
WBC # BLD AUTO: 4.4 10E9/L (ref 4–11)

## 2018-03-20 PROCEDURE — 85025 COMPLETE CBC W/AUTO DIFF WBC: CPT | Performed by: NURSE PRACTITIONER

## 2018-03-20 PROCEDURE — 36415 COLL VENOUS BLD VENIPUNCTURE: CPT | Performed by: NURSE PRACTITIONER

## 2018-03-20 PROCEDURE — 84443 ASSAY THYROID STIM HORMONE: CPT | Performed by: NURSE PRACTITIONER

## 2018-03-20 PROCEDURE — 82306 VITAMIN D 25 HYDROXY: CPT | Performed by: NURSE PRACTITIONER

## 2018-03-20 PROCEDURE — 80053 COMPREHEN METABOLIC PANEL: CPT | Performed by: NURSE PRACTITIONER

## 2018-03-20 PROCEDURE — 84439 ASSAY OF FREE THYROXINE: CPT | Performed by: NURSE PRACTITIONER

## 2018-03-20 PROCEDURE — 80061 LIPID PANEL: CPT | Performed by: NURSE PRACTITIONER

## 2018-03-20 NOTE — TELEPHONE ENCOUNTER
In reviewing recent ED visit and admission, this patient needs to follow-up with psychiatry.  These medications are specialty medications and I do not have training to decide which medication she should take or prescribe.    In the fact that there were questionable issues with ADHD medication and its use, I am not comfortable prescribing type of medication at this time.  That is also something psychiatry can make a decision on prescribe.    I am not sure what I can do to help her as PCP given this recent hospitalization as psychiatry is the best specialty option for her.  If there are things outside of medication management and counseling that she feels are necessary, let my team know and we will try to fit her in.    Electronically signed by Ratna Jacob PA-C  3/20/2018

## 2018-03-20 NOTE — TELEPHONE ENCOUNTER
Reason for Call: Week of March 20 Appointment, Requested Provider:  Ratna Jacob PA-C    PCP: Ratna Jacob    Reason for visit: Patients mom calling and states that patient was just released from the psych unit at [facility] yesterday, 3.19. Please advise if she can be worked in to see Ratna as soon as possible.      Duration of symptoms: 3.15 went to ED    Have you been treated for this in the past? No    Additional comments:     Can we leave a detailed message on this number? YES    Phone number patient can be reached at: Please call momCelina to set up appt, [phone]    Best Time: any    Call taken on 3/20/2018 at 4:32 PM by Dedra Downs

## 2018-03-20 NOTE — TELEPHONE ENCOUNTER
Patient called to schedule an appointment for a hospital follow-up or appeared on a report showing that they were recently discharged from the hospital.    Patient was admitted to [facility]  Discharged date: 03/19/18  Reason for hospital admission:  Bipolar Affective Disorder, Currently Manic, Severe, With Psychotic Features (H)  Does patient have future appointment scheduled with provider? No  Date of future appointment:  n/a      This information will be used to help the care team plan for the patients upcoming visit.  The triage RN may determine that a follow up call is necessary and reach out to the patient via the phone number listed in the chart.     Please route this message on routine priority to the Triage RN pool.

## 2018-03-20 NOTE — TELEPHONE ENCOUNTER
Detailed message left on secure voicemail stating the below information.  Closing this encounter.  NAT ZamarripaN, RN

## 2018-03-20 NOTE — TELEPHONE ENCOUNTER
"Hospital/TCU/ED for chronic condition Discharge Protocol    \"Hi, my name is Brynn Miles, a registered nurse, and I am calling from [facility].  I am calling to follow up and see how things are going for you after your recent emergency visit/hospital/TCU stay.\"    Tell me how you are doing now that you are home?\" Not doing the best.  I want to go off of all medications and let my body regulate itself.  Patient is reporting she just saw a psychiatrist today and was supposed to stop taking her Zyprexa and start taking Seroquel 100 mg HS.  She is then to start Lamictal next week: 25 mg for 3 weeks, 50 mg for 2 weeks then up to 100 mg continually.  Patient is reporting she thinks she just had a bad reaction to going off of her ADHD medications and having a severe lack of sleep.  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  She would like to get in to see PCP to discuss further.      Discharge Instructions    \"Let's review your discharge instructions.  What is/are the follow-up recommendations?  Pt. Response: see above    \"Has an appointment with your primary care provider been scheduled?\"   No (schedule appointment)    \"When you see the provider, I would recommend that you bring your medications with you.\"    Medications    \"Tell me what changed about your medicines when you discharged?\"    Changes to chronic meds?    0-1    \"What questions do you have about your medications?\"    None     New diagnoses of heart failure, COPD, diabetes, or MI?    No              Medication reconciliation completed? Yes  Was MTM referral placed (*Make sure to put transitions as reason for referral)?   No    Call Summary    \"What questions or concerns do you have about your recent visit and your follow-up care?\"     none    \"If you have questions or things don't continue to improve, we encourage you contact us through the main clinic number (give number).  " "Even if the clinic is not open, triage nurses are available 24/7 to help you.     We would like you to know that our clinic has extended hours (provide information).  We also have urgent care (provide details on closest location and hours/contact info)\"      \"Thank you for your time and take care!\"   NAT ZamarripaN, RN             "

## 2018-03-21 NOTE — TELEPHONE ENCOUNTER
Called pt's mom and left a msg that since there isn't a HIPPA form signed I can't give her any information or book an appt for her. But it was taken care of yesterday.  Brynn Argueta MA

## 2018-03-23 LAB
DEPRECATED CALCIDIOL+CALCIFEROL SERPL-MC: <32 UG/L (ref 20–75)
VITAMIN D2 SERPL-MCNC: <5 UG/L
VITAMIN D3 SERPL-MCNC: 27 UG/L

## 2018-03-29 NOTE — DISCHARGE SUMMARY
"Admit Date:     03/15/2018   Discharge Date:     03/19/2018      PSYCHIATRIC DISCHARGE SUMMARY:        The patient was hospitalized under the care of Dr. Rice  between 03/15/2018 and 3/19/2018.      CHIEF COMPLAINT AND REASON FOR ADMISSION:  The patient is a 38-year-old ,  female, mother of 2, admitted through the emergency room due to severe agitation and bizarre behavior.  She was admitted on 72-hour hold.        HISTORY OF PRESENT ILLNESS:   The patient presented as partial reliable historian.  She reports that she has not been sleeping for a number of nights because she was \"excited with business opportunities.\"  She reported that she had decided to become a professional fisherman,  participate in the [place]-[employer] fishing contest. She said that this idea was suggested to her by one woman with whom she has exchanged messages on Facebook and also talked on the phone.        The patient suddenly stated that she had plans to go on vacation to [place] with her loved ones.  She voiced worries that her  was going to kidnap her 2 daughters,  and get them into prostitution ring.  So she went to school to  her girls, and exhibited very bizarre behavior.  The principal  from the school called Child Protective Services and the police.        The patient repeatedly denied auditory or visual hallucinations, but admitted that her thoughts were racing.  She said that she had quite a lot of ideas, and she knew people to write them down for her.  She said that she was supposed to take Adderall, but was not sure how much she was taking and how often.  According to her mother, the patient took only a few tablets of Adderall.  She did test positive for amphetamines in her urine drug screen.      PAST PSYCHIATRIC HISTORY:  The patient was diagnosed with attention deficit hyperactivity disorder and prescribed Adderall 30 mg daily.  The patient reported having a head injury 15 years ago.  During " this, she did not remember having a baby, kept repeating the same things for 3 days, that she was more or less normal for 5 years, but progressively has gotten worse in the past 10 years.  Mom also said the patient had episodes of staying awake for a few nights in her room, but no such extreme behaviors such as now.      CONSULTATIONS:   There were no consults performed during this hospital stay.      LABORATORY WORK:  The patient had following lab work:   Urine pregnancy test was negative. Basic metabolic panel was normal.  TSH was normal.  Glucose was 67.  CBC with differential on 03/15/2018 was normal.  Acetaminophen level less than 2, ethanol less than 0.01.  Urine drug screen was positive for amphetamines, and negative for the rest of screened substances.  CT of head without contrast was normal.  CBC with differential was repeated on 03/20/2018; it was normal.  Glucose was 103.  Fasting lipid panel was all normal.  Comprehensive metabolic battery showed only decreased calcium 8.3, otherwise, normal.  Vitamin D panel was within normal limits.         DISCHARGE DIAGNOSES:    1.  Likely bipolar affective disorder, nini, severe with psychotic features.   2.  Attention deficit hyperactivity disorder.     3.  Substance-induced mood disorder with manic features was subsequently ruled out during this hospitalization.      HOSPITAL COURSE:  After the patient came into this facility, she was put on 15-minute checks to ensure her safety.  She presented as pleasant, but easily distracted, very social and active in the milieu.   Confused why she was in the hospital, asking to leave.  Her reports was that she was not able to sleep in the hospital because she was a light sleeper.  The patient was moved into private room which calmed her a lot, made her less anxious.  With her agreement, was started on small dose of Zyprexa (5 mg at night.)  I met with the patient only 2 times; first time on the day of her admission and  second time on the day of discharge.       The patient's  came into the hospital to visit.  Both the patient and  said that they would like to meet with me, and had approximately 15 minutes long visit with them.  During that meeting, both of them said they wanted the patient to be discharged home.  We talked about the patient's presentation.  I told them that my initial impression was the patient's manic symptoms were induced by Adderall; however, given the fact the patient was taking very few tablets of Adderall,  and due to past longstanding history of emotional lability, I felt that a more appropriate diagnosis of bipolar affective disorder, nini.  Both patient and her  still wanted the patient to be discharged.  We discussed that Child Protective Services were informed about patient's inappropriate and bizarre behavior, see above.       The patient was discharged from the hospital against medical advice, but I provided them with 1 month's supply of medication.        MENTAL STATUS EXAMINATION:  The patient is a  female who looks less tense.  She is pleasant and has fair eye contact.  She insists that she feels anxious and would like to leave.  Thought processes more linear, more goal directed.  There is less mood lability and looseness of associations.  She denied auditory or visual hallucinations.  She could not come up with any logical explanation why she felt that her  was going to kidnap their 2 daughters.  She agreed that it was an irrational thought   She denies thinking that now.  Did not voice any other delusional ideas.  Denied any auditory or visual hallucinations, reported better sleep after being put into private room.  Denies suicidal or homicidal thoughts.  Insight was partial.  Judgment moderately impaired.  There were no abnormal involuntary movements noted during the interview, and I did not witness any abnormalities in the patient's posture and gait.       FOLLOWUP APPOINTMENTS:  The patient will follow up for medication management with Becki Tubbs on 2018 at 7:00 in the morning and for support therapy with Patricia Angela on 2018 at 7:00 in the morning.        DISCHARGE MEDICATIONS:      1.  Zyprexa 5 mg at bedtime.     2.  Continue Mobic 15 mg daily.   3.  Adderall was discontinued during this hospital stay.         MERISSA SCHULZ MD             D: 2018   T: 2018   MT: MARYANA      Name:     SUREKHA JACKSON   MRN:      [id number]        Account:        [id number]   :      1980           Admit Date:     03/15/2018                                  Discharge Date: 2018      Document: [id number]

## 2018-04-13 ENCOUNTER — HEALTH MAINTENANCE LETTER (OUTPATIENT)
Age: 38
End: 2018-04-13

## 2018-05-03 ENCOUNTER — TELEPHONE (OUTPATIENT)
Dept: FAMILY MEDICINE | Facility: CLINIC | Age: 38
End: 2018-05-03

## 2018-05-03 ENCOUNTER — ALLIED HEALTH/NURSE VISIT (OUTPATIENT)
Dept: FAMILY MEDICINE | Facility: OTHER | Age: 38
End: 2018-05-03
Payer: COMMERCIAL

## 2018-05-03 DIAGNOSIS — R07.0 THROAT PAIN: Primary | ICD-10-CM

## 2018-05-03 DIAGNOSIS — Z20.818 STREP THROAT EXPOSURE: ICD-10-CM

## 2018-05-03 DIAGNOSIS — R07.0 THROAT PAIN: ICD-10-CM

## 2018-05-03 LAB
DEPRECATED S PYO AG THROAT QL EIA: NORMAL
SPECIMEN SOURCE: NORMAL

## 2018-05-03 PROCEDURE — 87880 STREP A ASSAY W/OPTIC: CPT | Performed by: PHYSICIAN ASSISTANT

## 2018-05-03 PROCEDURE — 99207 ZZC NO CHARGE NURSE ONLY: CPT

## 2018-05-03 PROCEDURE — 87081 CULTURE SCREEN ONLY: CPT | Performed by: PHYSICIAN ASSISTANT

## 2018-05-03 RX ORDER — AMOXICILLIN 875 MG
875 TABLET ORAL 2 TIMES DAILY
Qty: 20 TABLET | Refills: 0 | Status: SHIPPED | OUTPATIENT
Start: 2018-05-03 | End: 2018-07-05

## 2018-05-03 NOTE — MR AVS SNAPSHOT
After Visit Summary   5/3/2018    Cecy Wilkerson    MRN: [id number]           Patient Information     Date Of Birth          1980        Visit Information        Provider Department      5/3/2018 4:15 PM CHRISS RICHARDS MA [facility]        Today's Diagnoses     Throat pain        Strep throat exposure           Follow-ups after your visit        Who to contact     If you have questions or need follow up information about today's clinic visit or your schedule please contact [facility] directly at [phone].  Normal or non-critical lab and imaging results will be communicated to you by [facility], letter or phone within 4 business days after the clinic has received the results. If you do not hear from us within 7 days, please contact the clinic through [facility] or phone. If you have a critical or abnormal lab result, we will notify you by phone as soon as possible.  Submit refill requests through [facility] or call your pharmacy and they will forward the refill request to us. Please allow 3 business days for your refill to be completed.          Additional Information About Your Visit        MyChart Information     [facility] gives you secure access to your electronic health record. If you see a primary care provider, you can also send messages to your care team and make appointments. If you have questions, please call your primary care clinic.  If you do not have a primary care provider, please call [phone] and they will assist you.        Care EveryWhere ID     This is your Care EveryWhere ID. This could be used by other organizations to access your [place] medical records  [id number]         Blood Pressure from Last 3 Encounters:   03/17/18 119/69   03/15/18 146/82   02/16/18 122/66    Weight from Last 3 Encounters:   03/18/18 160 lb (72.6 kg)   03/15/18 170 lb (77.1 kg)   02/16/18 170 lb (77.1 kg)              We Performed the Following     Beta strep group A culture     Strep,  Rapid Screen          Today's Medication Changes          These changes are accurate as of 5/3/18  4:42 PM.  If you have any questions, ask your nurse or doctor.               Start taking these medicines.        Dose/Directions    amoxicillin 875 MG tablet   Commonly known as:  AMOXIL   Used for:  Throat pain, Strep throat exposure   Started by:  Ratna Jacob PA-C        Dose:  875 mg   Take 1 tablet (875 mg) by mouth 2 times daily   Quantity:  20 tablet   Refills:  0            Where to get your medicines      These medications were sent to [id number] - [place], [place] - [address] [place] [address]  [address] [place] [address] [place] [address]    Hours:  M-F 8:30-6:30; Sat 9-4; closed Sunday Phone:  [phone]     amoxicillin 875 MG tablet                Primary Care Provider Office Phone # Fax #    Ratna Jacob PA-C [phone] [address] [place] [address]        Equal Access to Services     JESSICA MANCIA AH: Hadii jeremy ku hadasho Soomaali, waaxda luqadaha, qaybta kaalmada adeegyada, waxay idiin hayaan katrina khshelton grijalva . So [facility] [phone].    ATENCIÓN: Si habla español, tiene a worley disposición servicios gratuitos de asistencia lingüística. Llame al [phone].    We comply with applicable federal civil rights laws and [place] laws. We do not discriminate on the basis of race, color, national origin, age, disability, sex, sexual orientation, or gender identity.            Thank you!     Thank you for choosing [facility]  for your care. Our goal is always to provide you with excellent care. Hearing back from our patients is one way we can continue to improve our services. Please take a few minutes to complete the written survey that you may receive in the mail after your visit with us. Thank you!             Your Updated Medication List - Protect others around you: Learn how to safely use, store and throw away your medicines at www.disposemymeds.org.          This list  is accurate as of 5/3/18  4:42 PM.  Always use your most recent med list.                   Brand Name Dispense Instructions for use Diagnosis    amoxicillin 875 MG tablet    AMOXIL    20 tablet    Take 1 tablet (875 mg) by mouth 2 times daily    Throat pain, Strep throat exposure       meloxicam 15 MG tablet    MOBIC    30 tablet    Take 1 tablet (15 mg) by mouth daily    Left tennis elbow       OLANZapine 5 MG tablet    zyPREXA    30 tablet    Take 1 tablet (5 mg) by mouth At Bedtime    Bipolar affective disorder, currently manic, severe, with psychotic features (H)

## 2018-05-03 NOTE — NURSING NOTE
Patient notified of negative rapid strep per Malika Buckley PA-C.  Emily Rodriguez MA     5/3/2018

## 2018-05-03 NOTE — TELEPHONE ENCOUNTER
Reason for Call: Request for an order or referral:    Order or referral being requested: throat culture. Pt uses [facility] in [place]    Date needed:     Has the patient been seen by the PCP for this problem?     Additional comments:     Phone number Patient can be reached at:      Best Time:      Can we leave a detailed message on this number?  YES    Call taken on 5/3/2018 at 1:56 PM by Linda Holguin

## 2018-05-03 NOTE — TELEPHONE ENCOUNTER
Patient is having sore throat and was exposed to strep through her daughter who tested positive 4 days ago.

## 2018-05-04 LAB
BACTERIA SPEC CULT: NORMAL
SPECIMEN SOURCE: NORMAL

## 2018-07-05 ENCOUNTER — OFFICE VISIT (OUTPATIENT)
Dept: FAMILY MEDICINE | Facility: OTHER | Age: 38
End: 2018-07-05
Payer: COMMERCIAL

## 2018-07-05 VITALS
WEIGHT: 180.5 LBS | HEART RATE: 72 BPM | DIASTOLIC BLOOD PRESSURE: 68 MMHG | BODY MASS INDEX: 29.13 KG/M2 | RESPIRATION RATE: 20 BRPM | TEMPERATURE: 96.8 F | SYSTOLIC BLOOD PRESSURE: 114 MMHG

## 2018-07-05 DIAGNOSIS — E55.9 VITAMIN D DEFICIENCY: ICD-10-CM

## 2018-07-05 DIAGNOSIS — F39 MOOD DISORDER (H): Primary | ICD-10-CM

## 2018-07-05 PROCEDURE — 82306 VITAMIN D 25 HYDROXY: CPT | Performed by: PHYSICIAN ASSISTANT

## 2018-07-05 PROCEDURE — 36415 COLL VENOUS BLD VENIPUNCTURE: CPT | Performed by: PHYSICIAN ASSISTANT

## 2018-07-05 PROCEDURE — 99213 OFFICE O/P EST LOW 20 MIN: CPT | Performed by: PHYSICIAN ASSISTANT

## 2018-07-05 RX ORDER — CHOLECALCIFEROL (VITAMIN D3) 50 MCG
1 TABLET ORAL DAILY
COMMUNITY
End: 2021-06-10

## 2018-07-05 RX ORDER — CITALOPRAM HYDROBROMIDE 10 MG/1
10 TABLET ORAL DAILY
Qty: 30 TABLET | Refills: 0 | Status: SHIPPED | OUTPATIENT
Start: 2018-07-05 | End: 2018-07-06 | Stop reason: SINTOL

## 2018-07-05 ASSESSMENT — PAIN SCALES - GENERAL: PAINLEVEL: NO PAIN (0)

## 2018-07-05 NOTE — PROGRESS NOTES
SUBJECTIVE:   Cecy Wilkerson is a 38 year old female who presents to clinic today for the following health issues:      Depression and Anxiety Follow-Up    Status since last visit: Worsened     Other associated symptoms:None    Complicating factors:     Significant life event: No     Current substance abuse: None    PHQ-9 7/5/2018   Total Score 19   Q9: Suicide Ideation Not at all     No flowsheet data found.  Recommend psychiatric consultation  PHQ-9  English  PHQ-9   Any Language  RAUL-7  Suicide Assessment Five-step Evaluation and Treatment (SAFE-T)    Amount of exercise or physical activity: None    Problems taking medications regularly: Yes,  problems remembering to take    Medication side effects: none    Diet: regular (no restrictions)      Medication Followup of ADHD    Taking Medication as prescribed: NO    Side Effects:  Sweating, hair loss, agitation    Medication Helping Symptoms:  yes       Patient is a 38 year old female who is in clinic today due to depressed mood. Review of records show that she was recently hospitalized for a manic like episode on 03/15. At this time she was described to have been acting erratically, tried to remove daughters from school, had CPS called, tangential and disorganized thinking, and emotionally labile. While at the hospital she tested positive for amphetamines on urine drug screen. Remainder of the workup was normal, discharged on suspicion of bi-polar disorder with possible substance induced mood disorder.     Today the patient reports that she went to [facility] to meet with psychiatrist who she felt would not listen to her and began prescribing lamictal and seroquel for bi-polar disorder. We discussed referral to new psychiatric provider for formal evaluation. Patient was in agreement with this. We also discussed why I would not prescribe any adderal and will only start a low dose antidepressant. Patient was tearful throughout the appointment and  frequently asserted that she does not have bi-polar disorder. She also asserted that she tested negative for amphetamines while at the hospital. I did not dispute this, but rather spent majority of the conversation focusing on identifying social supports and coping skills.     Problem list and histories reviewed & adjusted, as indicated.  Additional history: as documented    Patient Active Problem List   Diagnosis     CARDIOVASCULAR SCREENING; LDL GOAL LESS THAN 160     H/O:      Generalized anxiety disorder     Generalized hyperhidrosis     External hemorrhoids     Attention deficit hyperactivity disorder (ADHD), combined type     Manic affective disorder with recurrent episode (H)     Past Surgical History:   Procedure Laterality Date      SECTION  12/15/2010     SECTION performed by ROSA HYMAN at  [facility]     COLONOSCOPY N/A 2015    Procedure: COMBINED COLONOSCOPY, SINGLE OR MULTIPLE BIOPSY/POLYPECTOMY BY BIOPSY;  Surgeon: Zenon Jay MD;  Location:  GI     ESOPHAGOSCOPY, GASTROSCOPY, DUODENOSCOPY (EGD), COMBINED N/A 2015    Procedure: COMBINED ESOPHAGOSCOPY, GASTROSCOPY, DUODENOSCOPY (EGD), BIOPSY SINGLE OR MULTIPLE;  Surgeon: Zenon Jay MD;  Location:  GI     EXCISE LESN NECK/CHEST,SUBCUTAN  childhood    remove some lumps from the neck as a child     HEMORRHOIDECTOMY EXTERNAL N/A 2015    Procedure: HEMORRHOIDECTOMY EXTERNAL;  Surgeon: Jose Potter MD;  Location:  OR       Social History   Substance Use Topics     Smoking status: Former Smoker     Packs/day: 1.00     Years: 3.00     Quit date: 2005     Smokeless tobacco: Never Used     Alcohol use Yes      Comment: 1 drink every 2 weeks or so     Family History   Problem Relation Age of Onset     Allergies Mother      bees and grass,pollens     Respiratory Mother      asthma     Alcohol/Drug Father      Simone's father     Alcohol/Drug Sister      Simone's sister     Alcohol/Drug Brother       Simone's brother     Allergies Brother      Grass and pollens.     Diabetes Maternal Grandmother      Adult onset     HEART DISEASE Maternal Grandmother      Hypertension Maternal Grandmother      Lipids Maternal Grandmother      Neurologic Disorder Brother      Respiratory Brother      asthma     Cerebrovascular Disease Paternal Grandmother      CVa age 35     Obesity Maternal Aunt      Obesity Maternal Uncle      GASTROINTESTINAL DISEASE Other      Crohn's first cousin         Current Outpatient Prescriptions   Medication Sig Dispense Refill     Cholecalciferol (VITAMIN D) 2000 units tablet Take 1 tablet by mouth daily       citalopram (CELEXA) 10 MG tablet Take 1 tablet (10 mg) by mouth daily 30 tablet 0     Allergies   Allergen Reactions     Nkda [No Known Drug Allergies]      No Known Allergies      Labs reviewed in EPIC    Reviewed and updated as needed this visit by clinical staff  Tobacco  Allergies  Meds  Med Hx  Surg Hx  Fam Hx  Soc Hx      Reviewed and updated as needed this visit by Provider         ROS:  CONSTITUTIONAL: NEGATIVE for fever, chills, change in weight  RESP: NEGATIVE for significant cough or SOB  CV: NEGATIVE for chest pain, palpitations or peripheral edema  PSYCHIATRIC: POSITIVE foranxiety, depressed mood, hopelessness and stress over recent difficulties with the mental health system    OBJECTIVE:     /68 (BP Location: Right arm, Patient Position: Chair, Cuff Size: Adult Regular)  Pulse 72  Temp 96.8  F (36  C) (Oral)  Resp 20  Wt 180 lb 8 oz (81.9 kg)  BMI 29.13 kg/m2  Body mass index is 29.13 kg/(m^2).  Limited exam today as majority of the visit was spent discussing mental health and establishing a plan for continued treatment.     Diagnostic Test Results:  No results found for this or any previous visit (from the past 24 hour(s)).    ASSESSMENT/PLAN:       1. Mood disorder (H)  Referral for psychiatric management placed. Patient is aware and agrees with this plan. We  will start a low dose SSRI with the agreement to follow up in 3-4 weeks.   - MENTAL HEALTH REFERRAL  - Adult; Psychiatry and Medication Management; Psychiatry; [facility]: [facility] [phone].  Medication management & future refills will be returned to [facility] PCP upon completion of evaluation; We lee...  - citalopram (CELEXA) 10 MG tablet; Take 1 tablet (10 mg) by mouth daily  Dispense: 30 tablet; Refill: 0    2. Vitamin D deficiency  We will test this at patient's request today.   - Vitamin D Deficiency    Follow up with clinic in 3-4 weeks or sooner if conditions change, worsen or fail to improve as expected.      Cornell Mclean PA-C  [facility]

## 2018-07-05 NOTE — NURSING NOTE
Health Maintenance Due   Topic Date Due     PHQ-2 Q1 YR  10/07/2017     PAP Q3 YR  02/11/2018     Yelitza IRWIN LPN

## 2018-07-05 NOTE — PATIENT INSTRUCTIONS
Depression Affects Your Mind and Body  Everyone feels sad or  blue  from time to time for a few days or weeks. Depression is when these feelings don't go away and they interfere with daily life.  Depression is a real illness that can develop at any age. It is one of the most common mental health problems in the [place] Depression makes you feel sad, helpless, and hopeless. It gets in the way of your life and relationships. It inhibits your ability to think and act. But, with help, you can feel better again.      When I was depressed, I felt awful. I was so tired all the time I could hardly think, but at night I couldn t fall asleep. My head hurt. My stomach hurt. I didn t know what was wrong with me.    Depression affects your whole body  Brain chemicals affect your body as well as your mood. So depression may do more than just make you feel low. You may also feel bad physically. Depression can:    Cause trouble with mental tasks such as remembering, concentrating, or making decisions    Make you feel nervous and jumpy    Cause trouble sleeping. Or you may sleep too much    Change your appetite    Cause headaches, stomachaches, or other aches and pains    Drain your body of energy  Depression and other illness  It is common for people who have chronic health problems to also have depression. It can often be hard to tell which one caused the other. A person might become depressed after finding out they have a health problem. But some studies suggest being depressed may make certain health problems more likely. And some depressed people stop taking care of themselves. This may make them more likely to get sick.  Date Last Reviewed: 1/1/2017 2000-2017 [employer]. [address] [place] [address], [place] [address]. All rights reserved. This information is not intended as a substitute for professional medical care. Always follow your healthcare professional's instructions.        Depression: Tips to Help Yourself     As your healthcare providers help treat your depression, you can also help yourself. Keep in mind that your illness affects you emotionally, physically, mentally, and socially. So full recovery will take time. Take care of your body and your soul, and be patient with yourself as you get better.  Self-care    Educate yourself. Read about treatment and medicine options. If you have the energy, attend local conferences or support groups. Keep a list of useful websites and helpful books and use them as needed. This illness is not your fault. Don t blame yourself for your depression.    Manage early symptoms. If you notice symptoms returning, experience triggers, or identify other factors that may lead to a depressive episode, get help as soon as possible. Ask trusted friends and family to monitor your behavior and let you know if they see anything of concern.    Work with your provider. Find a provider you can trust. Communicate honestly with that person and share information on your treatment for depression and your reaction to medicines.    Be prepared for a crisis. Know what to do if you experience a crisis. Keep the phone number of a crisis hotline and know the location of your community's urgent care centers and the closest emergency department.    Hold off on big decisions. Depression can cloud your judgment. So wait until you feel better before making major life decisions, such as changing jobs, moving, or getting  or .    Be patient. Recovering from depression is a process. Don t be discouraged if it takes some time to feel better.    Keep it simple. Depression saps your energy and concentration. So you won t be able to do all the things you used to do. Set small goals and do what you can.    Be with others. Don t isolate yourself you ll only feel worse. Try to be with other people. And take part in fun activities when you can. Go to a movie, ballgame, [employer] service, or social event. Talk  openly with people you can trust. And accept help when it s offered.  Take care of your body  People with depression often lose the desire to take care of themselves. That only makes their problems worse. During treatment and afterward, make a point to:    Exercise. It s a great way to take care of your body. And studies have shown that exercise helps fight depression.    Avoid drugs and alcohol. These may ease the pain in the short term. But they ll only make your problems worse in the long run.    Get relief from stress. Ask your healthcare provider for relaxation exercises and techniques to help relieve stress.    Eat right. A balanced and healthy diet helps keep your body healthy.  Date Last Reviewed: 1/1/2017 2000-2017 [employer]. [address] [place] [address], [place] [address]. All rights reserved. This information is not intended as a substitute for professional medical care. Always follow your healthcare professional's instructions.        Understanding Bipolar Disorder  Bipolar disorder is a serious disorder of the brain. It may severely disrupt your life. At times, it may cause you and your loved ones great pain. But there is hope. Although there is no cure, treatment can help control your symptoms. Talk with your healthcare provider or a mental health professional. He or she can offer guidance and support.    What causes bipolar disorder?  The exact causes of bipolar disorder aren t known. It is known that the disease runs in families. Genes that affect nerve cells in the brain may be inherited, but as yet these genes have not been found.  Who does it affect?  Over 5 million adults in this country have bipolar disorder. Most often, it strikes young adults. It can affect children and older adults as well. Bipolar disorder affects both men and women. It can strike people of all races, cultures, and incomes.  Ups and downs  Bipolar disorder used to be called manic-depressive illness. That is  because it causes extreme mood swings. At times the person may feel almost too happy. These times are often followed by great despair. In some cases, both extremes may occur at once. More often, moods shift back and forth. These mood swings may occur just once in a while. Or they may happen 4 or more times a year. Without treatment, they will likely recur throughout life.  Manic episodes  During manic episodes of bipolar disorder, you feel like you re on top of the world. Even the worst news can t bring you down. You ll likely feel as if you can do anything. And sometimes you may try. You may take great risks, thinking you can t be hurt. You may also talk too fast, and your thoughts may race. You may go for days without sleeping. And you might be very active and do a lot of things in a short time. Manic episodes often end in a depression.  Depressive episodes  In depressive episodes, you feel intense sadness and depression. You may also feel worthless, tired, and helpless. Even the things you value most don t give you pleasure. At times you may want to die. You may even think about taking your own life.  Date Last Reviewed: 2/1/2017 2000-2017 [employer]. [address] [place] [address], [place] [address]. All rights reserved. This information is not intended as a substitute for professional medical care. Always follow your healthcare professional's instructions.

## 2018-07-05 NOTE — MR AVS SNAPSHOT
After Visit Summary   7/5/2018    Cecy Wilkerson    MRN: [id number]           Patient Information     Date Of Birth          1980        Visit Information        Provider Department      7/5/2018 1:40 PM Cornell Mclean PA-C [facility]        Today's Diagnoses     Mood disorder (H)    -  1    Vitamin D deficiency          Care Instructions      Depression Affects Your Mind and Body  Everyone feels sad or  blue  from time to time for a few days or weeks. Depression is when these feelings don't go away and they interfere with daily life.  Depression is a real illness that can develop at any age. It is one of the most common mental health problems in the [place] Depression makes you feel sad, helpless, and hopeless. It gets in the way of your life and relationships. It inhibits your ability to think and act. But, with help, you can feel better again.      When I was depressed, I felt awful. I was so tired all the time I could hardly think, but at night I couldn t fall asleep. My head hurt. My stomach hurt. I didn t know what was wrong with me.    Depression affects your whole body  Brain chemicals affect your body as well as your mood. So depression may do more than just make you feel low. You may also feel bad physically. Depression can:    Cause trouble with mental tasks such as remembering, concentrating, or making decisions    Make you feel nervous and jumpy    Cause trouble sleeping. Or you may sleep too much    Change your appetite    Cause headaches, stomachaches, or other aches and pains    Drain your body of energy  Depression and other illness  It is common for people who have chronic health problems to also have depression. It can often be hard to tell which one caused the other. A person might become depressed after finding out they have a health problem. But some studies suggest being depressed may make certain health problems more likely. And some depressed people stop taking  care of themselves. This may make them more likely to get sick.  Date Last Reviewed: 1/1/2017 2000-2017 [employer]. [address], [place] [address]. All rights reserved. This information is not intended as a substitute for professional medical care. Always follow your healthcare professional's instructions.        Depression: Tips to Help Yourself    As your healthcare providers help treat your depression, you can also help yourself. Keep in mind that your illness affects you emotionally, physically, mentally, and socially. So full recovery will take time. Take care of your body and your soul, and be patient with yourself as you get better.  Self-care    Educate yourself. Read about treatment and medicine options. If you have the energy, attend local conferences or support groups. Keep a list of useful websites and helpful books and use them as needed. This illness is not your fault. Don t blame yourself for your depression.    Manage early symptoms. If you notice symptoms returning, experience triggers, or identify other factors that may lead to a depressive episode, get help as soon as possible. Ask trusted friends and family to monitor your behavior and let you know if they see anything of concern.    Work with your provider. Find a provider you can trust. Communicate honestly with that person and share information on your treatment for depression and your reaction to medicines.    Be prepared for a crisis. Know what to do if you experience a crisis. Keep the phone number of a crisis hotline and know the location of your community's urgent care centers and the closest emergency department.    Hold off on big decisions. Depression can cloud your judgment. So wait until you feel better before making major life decisions, such as changing jobs, moving, or getting  or .    Be patient. Recovering from depression is a process. Don t be discouraged if it takes some time to feel  better.    Keep it simple. Depression saps your energy and concentration. So you won t be able to do all the things you used to do. Set small goals and do what you can.    Be with others. Don t isolate yourself--you ll only feel worse. Try to be with other people. And take part in fun activities when you can. Go to a movie, ballgame, [employer] service, or social event. Talk openly with people you can trust. And accept help when it s offered.  Take care of your body  People with depression often lose the desire to take care of themselves. That only makes their problems worse. During treatment and afterward, make a point to:    Exercise. It s a great way to take care of your body. And studies have shown that exercise helps fight depression.    Avoid drugs and alcohol. These may ease the pain in the short term. But they ll only make your problems worse in the long run.    Get relief from stress. Ask your healthcare provider for relaxation exercises and techniques to help relieve stress.    Eat right. A balanced and healthy diet helps keep your body healthy.  Date Last Reviewed: 1/1/2017 2000-2017 [employer]. [address] [place] [address]. All rights reserved. This information is not intended as a substitute for professional medical care. Always follow your healthcare professional's instructions.        Understanding Bipolar Disorder  Bipolar disorder is a serious disorder of the brain. It may severely disrupt your life. At times, it may cause you and your loved ones great pain. But there is hope. Although there is no cure, treatment can help control your symptoms. Talk with your healthcare provider or a mental health professional. He or she can offer guidance and support.    What causes bipolar disorder?  The exact causes of bipolar disorder aren t known. It is known that the disease runs in families. Genes that affect nerve cells in the brain may be inherited, but as yet these genes have  not been found.  Who does it affect?  Over 5 million adults in this country have bipolar disorder. Most often, it strikes young adults. It can affect children and older adults as well. Bipolar disorder affects both men and women. It can strike people of all races, cultures, and incomes.  Ups and downs  Bipolar disorder used to be called manic-depressive illness. That is because it causes extreme mood swings. At times the person may feel almost too happy. These times are often followed by great despair. In some cases, both extremes may occur at once. More often, moods shift back and forth. These mood swings may occur just once in a while. Or they may happen 4 or more times a year. Without treatment, they will likely recur throughout life.  Manic episodes  During manic episodes of bipolar disorder, you feel like you re on top of the world. Even the worst news can t bring you down. You ll likely feel as if you can do anything. And sometimes you may try. You may take great risks, thinking you can t be hurt. You may also talk too fast, and your thoughts may race. You may go for days without sleeping. And you might be very active and do a lot of things in a short time. Manic episodes often end in a depression.  Depressive episodes  In depressive episodes, you feel intense sadness and depression. You may also feel worthless, tired, and helpless. Even the things you value most don t give you pleasure. At times you may want to die. You may even think about taking your own life.  Date Last Reviewed: 2/1/2017 2000-2017 [employer]. [address] [place] [address], [place] [address]. All rights reserved. This information is not intended as a substitute for professional medical care. Always follow your healthcare professional's instructions.                Follow-ups after your visit        Additional Services     MENTAL HEALTH REFERRAL  - Adult; Psychiatry and Medication Management; Psychiatry; [facility]: Collaborative Care  Psychiatry Service [phone].  Medication management & future refills will be returned to [facility] PCP upon completion of evaluation; We lee...       All scheduling is subject to the client's specific insurance plan & benefits, provider/location availability, and provider clinical specialities.  Please arrive 15 minutes early for your first appointment and bring your completed paperwork.    Please be aware that coverage of these services is subject to the terms and limitations of your health insurance plan.  Call member services at your health plan with any benefit or coverage questions.                            Who to contact     If you have questions or need follow up information about today's clinic visit or your schedule please contact [facility] directly at [phone].  Normal or non-critical lab and imaging results will be communicated to you by [facility], letter or phone within 4 business days after the clinic has received the results. If you do not hear from us within 7 days, please contact the clinic through [facility] or phone. If you have a critical or abnormal lab result, we will notify you by phone as soon as possible.  Submit refill requests through [employer] or call your pharmacy and they will forward the refill request to us. Please allow 3 business days for your refill to be completed.          Additional Information About Your Visit        [facility] Information     [employer] gives you secure access to your electronic health record. If you see a primary care provider, you can also send messages to your care team and make appointments. If you have questions, please call your primary care clinic.  If you do not have a primary care provider, please call [phone] and they will assist you.        Care EveryWhere ID     This is your Care EveryWhere ID. This could be used by other organizations to access your [place] medical records  [id number]        Your Vitals Were     Pulse Temperature  Respirations BMI (Body Mass Index)          72 96.8  F (36  C) (Oral) 20 29.13 kg/m2         Blood Pressure from Last 3 Encounters:   07/05/18 114/68   03/17/18 119/69   03/15/18 146/82    Weight from Last 3 Encounters:   07/05/18 180 lb 8 oz (81.9 kg)   03/18/18 160 lb (72.6 kg)   03/15/18 170 lb (77.1 kg)              We Performed the Following     MENTAL HEALTH REFERRAL  - Adult; Psychiatry and Medication Management; Psychiatry; [facility]: [facility] Psychiatry Service [phone].  Medication management & future refills will be returned to [facility] PCP upon completion of evaluation; We lee...     Vitamin D Deficiency          Today's Medication Changes          These changes are accurate as of 7/5/18  2:08 PM.  If you have any questions, ask your nurse or doctor.               Start taking these medicines.        Dose/Directions    citalopram 10 MG tablet   Commonly known as:  celeXA   Used for:  Mood disorder (H)   Started by:  Cornell Mclean PA-C        Dose:  10 mg   Take 1 tablet (10 mg) by mouth daily   Quantity:  30 tablet   Refills:  0            Where to get your medicines      These medications were sent to [facility] - [place], [place] - [address] [place] [id number]    Hours:  M-F 8:30-6:30; Sat 9-4; closed Sunday Phone:  [phone]     citalopram 10 MG tablet                Primary Care Provider Office Phone # Fax #    Ratna Jacob PA-C [phone] [address] [place] [address]        Equal Access to Services     [facility] AH: Hadii jeremy lara Soaspen, waaxda luqadaha, qaybta kaalmada marleni, alexsandra bentley. So [facility] [phone].    ATENCIÓN: Si habla español, tiene a worley disposición servicios gratuitos de asistencia lingüística. Llame al [phone].    We comply with applicable federal civil rights laws and [place] laws. We do not discriminate on the basis of race, color, national origin, age,  disability, sex, sexual orientation, or gender identity.            Thank you!     Thank you for choosing [facility]  for your care. Our goal is always to provide you with excellent care. Hearing back from our patients is one way we can continue to improve our services. Please take a few minutes to complete the written survey that you may receive in the mail after your visit with us. Thank you!             Your Updated Medication List - Protect others around you: Learn how to safely use, store and throw away your medicines at www.disposemymeds.org.          This list is accurate as of 7/5/18  2:08 PM.  Always use your most recent med list.                   Brand Name Dispense Instructions for use Diagnosis    citalopram 10 MG tablet    celeXA    30 tablet    Take 1 tablet (10 mg) by mouth daily    Mood disorder (H)       vitamin D 2000 units tablet      Take 1 tablet by mouth daily

## 2018-07-06 ENCOUNTER — TELEPHONE (OUTPATIENT)
Dept: FAMILY MEDICINE | Facility: OTHER | Age: 38
End: 2018-07-06

## 2018-07-06 DIAGNOSIS — F39 MOOD DISORDER (H): Primary | ICD-10-CM

## 2018-07-06 LAB — DEPRECATED CALCIDIOL+CALCIFEROL SERPL-MC: 64 UG/L (ref 20–75)

## 2018-07-06 RX ORDER — ARIPIPRAZOLE 15 MG/1
15 TABLET ORAL AT BEDTIME
Qty: 30 TABLET | Refills: 1 | Status: SHIPPED | OUTPATIENT
Start: 2018-07-06 | End: 2018-08-15

## 2018-07-06 ASSESSMENT — PATIENT HEALTH QUESTIONNAIRE - PHQ9: SUM OF ALL RESPONSES TO PHQ QUESTIONS 1-9: 19

## 2018-07-06 NOTE — TELEPHONE ENCOUNTER
I have attempted to contact this patient by phone with the following results: left message to return my call on answering machine.  Emily Rodriguez MA     7/6/2018

## 2018-07-06 NOTE — TELEPHONE ENCOUNTER
RN huddled with Naveed about message below and patient's appointment. He said patient was prescribed Adderall back in March. She then had a manic episode and was brought to the ED. Patient tested positive for amphetamine (NOT Methamphetamine). The staff at the ED thought the manic episode was caused by the Adderall and advised she not take it and follow up with psychiatry.     Patient was seen yesterday and was hoping to get treatment for her ADHD. Naveed brought up to the patient the drug screen that showed her adderall use (amphetamine) during her manic behavior. He advised he was not comfortable prescribing this and she needs to follow up with psych. Patient was upset during visit. After reviewing the chart, PCP Ratna Jacob was also not comfortable prescribing Adderall again and also advised patient to see psychiatry.     Attempted to call the patient at [phone] (home), no answer. Left message for a return call to the clinic when available.     Gabi Aragon RN  [facility]

## 2018-07-06 NOTE — TELEPHONE ENCOUNTER
"Spoke to the patient for 25 minutes on the phone. Patient is upset about her visit yesterday with Naveed Mclean.     Patient said she is struggling really bad with her mental health. She said she does not know what to do and she just wants to feel normal again. Patient had many issues she was concerned about.     1. Patient feels like she has been labeled with \"Bipoloar\". Patient said she has never been diagnosed with this and does not believe this is an accurate diagnosis. RN reviewed chart. Advised when she was in the ED on 3/15/18, the provider noted \"Likely bipolar affective disorder, nini, severe with psychotic features.\" RN advised this is likely where the word bipolor was brought up. Patient said at her OV yesterday it was brought up a few times that she was bipolor and that really upset her. She said her paperwork also had information on bipolor and that added to her frustration.     2. Patient does not believe amphetamine was found in her urine. RN reviewed drug screen from the ED on 3/15/18. Advised amphetamine was found in her urine. Advised she was on Adderall at that time and that would be the reason for it in her urine. Patient said she does not believe she was on Adderall at that time. Patient said life was busy and stressful and she was forgetting to take her medications. She said she stopped the Adderall at least a week before her manic episode so she is not sure how that would be the cause. After thinking about it, she said she is almost afraid to take the Adderall again in case it was the cause of her Manic episode. RN advised this is the concern both Ratna Jacob and Naveed Mclean had with prescribing her Adderall again. Patient said she remembers the best she ever felt is when she was on the Adderall and her ADHD was managed. She said that is why she was asking to try it again but now does understand why both providers were hesitant to prescribe it again.     All in all, patient said she is " really struggling with her mental health and just needs some resources. She said she feel if her ADHD was managed, she would start to feel a little better. RN advised with everything going on, both providers feel she needs to get in with psychiatry. Patient said she is scheduled with her psychiatrist at [facility] (Asya Tubbs) on 8/4/18. She said that is the soonest she could get in. She is also scheduled with Ratna Jacob 8/15/18 which is also the soonest she could get in.     Patient said she is just struggling with adult life. She said she can't remember to pay bills. She can't even sit and read with her kids. She said she does not feel herself and just wants to feel normal again.     Patient has appropriate follow up appointments scheduled. She is concerned about the meantime and what resources are available.     Naveed did put in a referral for behavioral health. He was hoping maybe Lesvia Roman could see the patient as an interm until her appointment in August. Naveed did start patient on a medication hoping it will help her some.     Will route message to Lesvia to review and see if she can be any help to the patient.     Gabi Aragon RN  [facility]

## 2018-07-06 NOTE — TELEPHONE ENCOUNTER
Patient's mother (Celina) called to express concerns about daughter's visit from yesterday.  Mother was not present during the visit and stated that daughter was seen for Depression, ADHD, and recently was diagnosed with manic bipolar.    Mother states that Cecy came in because she was looking for a different medication - she was sure if the Adderall was causing suicidal thoughts, but she knows she wasn't feeling well and very tired on the medication.    During her visit yesterday, the mother said that Cornell had mentioned that Cecy recently tested positive for Methamphetamine use which really upset Cecy.  Because of this he was not going to prescribe drugs for her and implied she was a drug seeker.    Celina said that after the appointment Cecy had called her and was crying hysterically and she talked to her until someone could get to her.    Mother asked about options for Behavioral Health help - talked to her about Lesvia Roman and her role.  Mom thought this would be helpful.  I did inform her Lesvia was out today but I would talk to Gabi our RN at the [facility] and ask her to reach out to Cecy today.     Mom states Cecy could be reached on her cell phone today.    Thank You,      Binta Pope  Patient Information Supervisor  [place] [facility], and [facility]  [phone]

## 2018-07-06 NOTE — TELEPHONE ENCOUNTER
I consulted with the lead physician at the clinic who suggested that we switch you from Celexa to Abilify 15mg night as this medication is will better treat your symptoms. I understand that you just picked up the celexa and apologize for the abrupt switch, but want to provide you with the best treatment we can. Stop the celexa and start the abilify.     Cornell Mclean PA-C on 7/6/2018 at 10:45 AM

## 2018-07-09 NOTE — TELEPHONE ENCOUNTER
I called this patient and informed her of the following per Cornell Mclean PA-C:  I consulted with the lead physician at the clinic who suggested that we switch you from Celexa to Abilify 15mg night as this medication is will better treat your symptoms. I understand that you just picked up the celexa and apologize for the abrupt switch, but want to provide you with the best treatment we can. Stop the celexa and start the abilify.       She stated she is feeling better on the Celexa and wants to keep taking this medication.    Cornell Mclean PA-C was informed of this.

## 2018-07-09 NOTE — TELEPHONE ENCOUNTER
Patient was offered abilify and declined. She would prefer to stay on Celexa. Appointment with PCP in August.     Cornell Mclean PA-C on 7/9/2018 at 12:13 PM

## 2018-08-15 ENCOUNTER — OFFICE VISIT (OUTPATIENT)
Dept: FAMILY MEDICINE | Facility: CLINIC | Age: 38
End: 2018-08-15
Payer: COMMERCIAL

## 2018-08-15 VITALS
HEIGHT: 66 IN | WEIGHT: 175 LBS | TEMPERATURE: 96.9 F | BODY MASS INDEX: 28.12 KG/M2 | DIASTOLIC BLOOD PRESSURE: 80 MMHG | RESPIRATION RATE: 18 BRPM | HEART RATE: 90 BPM | SYSTOLIC BLOOD PRESSURE: 120 MMHG | OXYGEN SATURATION: 95 %

## 2018-08-15 DIAGNOSIS — Z23 NEED FOR VACCINATION: ICD-10-CM

## 2018-08-15 DIAGNOSIS — Z00.01 ENCOUNTER FOR ROUTINE ADULT MEDICAL EXAM WITH ABNORMAL FINDINGS: Primary | ICD-10-CM

## 2018-08-15 PROCEDURE — 99395 PREV VISIT EST AGE 18-39: CPT | Mod: 25 | Performed by: PHYSICIAN ASSISTANT

## 2018-08-15 PROCEDURE — 90471 IMMUNIZATION ADMIN: CPT | Performed by: PHYSICIAN ASSISTANT

## 2018-08-15 PROCEDURE — G0145 SCR C/V CYTO,THINLAYER,RESCR: HCPCS | Performed by: PHYSICIAN ASSISTANT

## 2018-08-15 PROCEDURE — 90715 TDAP VACCINE 7 YRS/> IM: CPT | Performed by: PHYSICIAN ASSISTANT

## 2018-08-15 PROCEDURE — 87624 HPV HI-RISK TYP POOLED RSLT: CPT | Performed by: PHYSICIAN ASSISTANT

## 2018-08-15 ASSESSMENT — PAIN SCALES - GENERAL: PAINLEVEL: NO PAIN (0)

## 2018-08-15 NOTE — PROGRESS NOTES
"   SUBJECTIVE:   CC: Cecy Wilkerson is an 38 year old woman who presents for preventive health visit.  She unfortunately had a severe manic episode [facility].  There was question as to whether she was overtaking her amphetamine type medication for ADHD.  She was being managed as I was not available by 1 of my colleagues in [place].  Unfortunately she felt she was not treated appropriately and refused to continue management with him.  She has been seen [facility] in [place] on a consistent basis.  He is seen Neli Tubbs who now has her on a combination of citalopram 20 mg daily along with topiramate 200 mg daily.  Today the patient brings both a prescription for Vyvanse and Adderall with her stating she wants to \"get rid of these medications today\" as she does not want to be labeled as overtaking medications.  She is not requesting any new medications for ADHD-she is worried that potentially that family of medicine may have put her into a manic episode although she also admits that she abruptly stopped using Wellbutrin around the time that occurred as well.  She was not getting a lot of sleep and was working 3 jobs.    Physical   Annual:     Getting at least 3 servings of Calcium per day:  NO    Bi-annual eye exam:  Yes    Dental care twice a year:  NO    Sleep apnea or symptoms of sleep apnea:  None    Diet:  Carbohydrate counting    Frequency of exercise:  1 day/week    Duration of exercise:  15-30 minutes    Taking medications regularly:  No    Barriers to taking medications:  Problems remembering to take them    Additional concerns today:  YES        Depression and Anxiety Follow-Up    Status since last visit: Improved since gotten on med     Other associated symptoms:None    Complicating factors:     Significant life event: No     Current substance abuse: None    PHQ-9 7/5/2018   Total Score 19   Q9: Suicide Ideation Not at all     No flowsheet data " found.    PHQ-9  English  PHQ-9   Any Language  RAUL-7  Suicide Assessment Five-step Evaluation and Treatment (SAFE-T)    Today's PHQ-2 Score:   PHQ-2 (  [employer]) 8/15/2018   Q1: Little interest or pleasure in doing things 1   Q2: Feeling down, depressed or hopeless 1   PHQ-2 Score 2   Q1: Little interest or pleasure in doing things Several days   Q2: Feeling down, depressed or hopeless Several days   PHQ-2 Score 2       Abuse: Current or Past(Physical, Sexual or Emotional)- No  Do you feel safe in your environment - Yes    Social History   Substance Use Topics     Smoking status: Former Smoker     Packs/day: 1.00     Years: 3.00     Quit date: 2005     Smokeless tobacco: Never Used     Alcohol use Yes      Comment: 1 drink every 2 weeks or so     Alcohol Use 8/15/2018   If you drink alcohol do you typically have greater than 3 drinks per day OR greater than 7 drinks per week? No   No flowsheet data found.    Reviewed orders with patient.  Reviewed health maintenance and updated orders accordingly - Yes  Patient Active Problem List   Diagnosis     CARDIOVASCULAR SCREENING; LDL GOAL LESS THAN 160     H/O:      Generalized anxiety disorder     Generalized hyperhidrosis     External hemorrhoids     Attention deficit hyperactivity disorder (ADHD), combined type     Manic affective disorder with recurrent episode (H)     Past Surgical History:   Procedure Laterality Date      SECTION  12/15/2010     SECTION performed by ROSA HYMAN at  [facility]     COLONOSCOPY N/A 2015    Procedure: COMBINED COLONOSCOPY, SINGLE OR MULTIPLE BIOPSY/POLYPECTOMY BY BIOPSY;  Surgeon: Zenon Jay MD;  Location:  GI     ESOPHAGOSCOPY, GASTROSCOPY, DUODENOSCOPY (EGD), COMBINED N/A 2015    Procedure: COMBINED ESOPHAGOSCOPY, GASTROSCOPY, DUODENOSCOPY (EGD), BIOPSY SINGLE OR MULTIPLE;  Surgeon: Zenon Jay MD;  Location:  GI     EXCISE LESN NECK/CHEST,SUBCUTAN  childhood    remove some  lumps from the neck as a child     HEMORRHOIDECTOMY EXTERNAL N/A 5/22/2015    Procedure: HEMORRHOIDECTOMY EXTERNAL;  Surgeon: Jose Potter MD;  Location:  OR       Social History   Substance Use Topics     Smoking status: Former Smoker     Packs/day: 1.00     Years: 3.00     Quit date: 5/1/2005     Smokeless tobacco: Never Used     Alcohol use Yes      Comment: 1 drink every 2 weeks or so     Family History   Problem Relation Age of Onset     Allergies Mother      bees and grass,pollens     Respiratory Mother      asthma     Alcohol/Drug Father      Simone's father     Alcohol/Drug Sister      Simone's sister     Alcohol/Drug Brother      Simone's brother     Allergies Brother      Grass and pollens.     Diabetes Maternal Grandmother      Adult onset     HEART DISEASE Maternal Grandmother      Hypertension Maternal Grandmother      Lipids Maternal Grandmother      Neurologic Disorder Brother      Respiratory Brother      asthma     Cerebrovascular Disease Paternal Grandmother      CVa age 35     Obesity Maternal Aunt      Obesity Maternal Uncle      GASTROINTESTINAL DISEASE Other      Crohn's first cousin           Mammogram not appropriate for this patient based on age.    Pertinent mammograms are reviewed under the imaging tab.  History of abnormal Pap smear: NO - age 30-65 PAP every 5 years with negative HPV co-testing recommended  PAP / HPV 2/11/2015 2/3/2011 11/9/2009   PAP NIL NIL NIL     Reviewed and updated as needed this visit by clinical staff  Allergies  Meds         Reviewed and updated as needed this visit by Provider            Review of Systems  CONSTITUTIONAL: NEGATIVE for fever, chills, change in weight  INTEGUMENTARU/SKIN: NEGATIVE for worrisome rashes, moles or lesions  EYES: NEGATIVE for vision changes or irritation  ENT: NEGATIVE for ear, mouth and throat problems  RESP: NEGATIVE for significant cough or SOB  BREAST: NEGATIVE for masses, tenderness or discharge  CV: NEGATIVE for chest  pain, palpitations or peripheral edema  GI: NEGATIVE for nausea, abdominal pain, heartburn, or change in bowel habits  : NEGATIVE for unusual urinary or vaginal symptoms. Periods are regular.  MUSCULOSKELETAL: NEGATIVE for significant arthralgias or myalgia  NEURO: NEGATIVE for weakness, dizziness or paresthesias  ENDOCRINE: NEGATIVE for temperature intolerance, skin/hair changes  HEME/ALLERGY/IMMUNE: NEGATIVE for bleeding problems  PSYCHIATRIC: NEGATIVE for changes in mood or affect     OBJECTIVE:   There were no vitals taken for this visit.  Physical Exam  GENERAL: healthy, alert and no distress  EYES: Eyes grossly normal to inspection, PERRL and conjunctivae and sclerae normal  HENT: ear canals and TM's normal, nose and mouth without ulcers or lesions  NECK: no adenopathy, no asymmetry, masses, or scars and thyroid normal to palpation  RESP: lungs clear to auscultation - no rales, rhonchi or wheezes  BREAST: normal without masses, tenderness or nipple discharge and no palpable axillary masses or adenopathy  CV: regular rate and rhythm, normal S1 S2, no S3 or S4, no murmur, click or rub, no peripheral edema and peripheral pulses strong  ABDOMEN: soft, nontender, no hepatosplenomegaly, no masses and bowel sounds normal   (female): normal female external genitalia, normal urethral meatus, vaginal mucosa pink, moist, well rugated, and normal cervix/adnexa/uterus without masses or discharge  MS: no gross musculoskeletal defects noted, no edema  SKIN: no suspicious lesions or rashes  NEURO: Normal strength and tone, mentation intact and speech normal  PSYCH: mentation appears normal, affect normal/bright    Diagnostic Test Results:  none     ASSESSMENT/PLAN:       ICD-10-CM    1. Encounter for routine adult medical exam with abnormal findings Z00.01 Pap imaged thin layer screen with HPV - recommended age 30 - 65 years (select HPV order below)     HPV High Risk Types DNA Cervical   2. Need for vaccination Z23 TDAP  "VACCINE (ADACEL) [43294.002]     1st  Administration  [76445]       COUNSELING:  Reviewed preventive health counseling, as reflected in patient instructions       Regular exercise       Healthy diet/nutrition       Vision screening       Immunizations    Vaccinated for: TDAP      She is given information on disposing of medications through the [facility]'s dept.           Osteoporosis Prevention/Bone Health    BP Readings from Last 1 Encounters:   07/05/18 114/68     Estimated body mass index is 29.13 kg/(m^2) as calculated from the following:    Height as of 3/15/18: 5' 6\" (1.676 m).    Weight as of 7/5/18: 180 lb 8 oz (81.9 kg).      Weight management plan: Discussed healthy diet and exercise guidelines and patient will follow up in 12 months in clinic to re-evaluate.     reports that she quit smoking about 13 years ago. She has a 3.00 pack-year smoking history. She has never used smokeless tobacco.      Counseling Resources:  ATP IV Guidelines  Pooled Cohorts Equation Calculator  Breast Cancer Risk Calculator  FRAX Risk Assessment  ICSI Preventive Guidelines  Dietary Guidelines for Americans, 2010  USDA's MyPlate  ASA Prophylaxis  Lung CA Screening    Ratna Jacob PA-C  [facility]    Orders Placed This Encounter     TDAP VACCINE (ADACEL) [12430.002]     1st  Administration  [27428]     Pap imaged thin layer screen with HPV - recommended age 30 - 65 years (select HPV order below)     HPV High Risk Types DNA Cervical     Citalopram Hydrobromide (CELEXA PO)     TOPIRAMATE PO       AVS given to patient upon discharge today.  Electronically signed by Ratna Jacob PA-C  August 15, 2018  2:50 PM      "

## 2018-08-15 NOTE — NURSING NOTE
"Chief Complaint   Patient presents with     Physical       Initial /80  Pulse 90  Temp 96.9  F (36.1  C) (Temporal)  Resp 18  Ht 5' 6\" (1.676 m)  Wt 175 lb (79.4 kg)  SpO2 95%  BMI 28.25 kg/m2 Estimated body mass index is 28.25 kg/(m^2) as calculated from the following:    Height as of this encounter: 5' 6\" (1.676 m).    Weight as of this encounter: 175 lb (79.4 kg).  BP completed using cuff size: ana Argueta MA      "

## 2018-08-15 NOTE — MR AVS SNAPSHOT
"              After Visit Summary   8/15/2018    Cecy Wilkerson    MRN: [id number]           Patient Information     Date Of Birth          1980        Visit Information        Provider Department      8/15/2018 1:30 PM Ratna Jacob PA-C [facility]        Today's Diagnoses     Encounter for routine adult medical exam with abnormal findings    -  1    Need for vaccination           Follow-ups after your visit        Who to contact     If you have questions or need follow up information about today's clinic visit or your schedule please contact [facility] directly at [phone].  Normal or non-critical lab and imaging results will be communicated to you by [employer], letter or phone within 4 business days after the clinic has received the results. If you do not hear from us within 7 days, please contact the clinic through [employer] or phone. If you have a critical or abnormal lab result, we will notify you by phone as soon as possible.  Submit refill requests through [employer] or call your pharmacy and they will forward the refill request to us. Please allow 3 business days for your refill to be completed.          Additional Information About Your Visit        MyChart Information     [employer] gives you secure access to your electronic health record. If you see a primary care provider, you can also send messages to your care team and make appointments. If you have questions, please call your primary care clinic.  If you do not have a primary care provider, please call [phone] and they will assist you.        Care EveryWhere ID     This is your Care EveryWhere ID. This could be used by other organizations to access your [place] medical records  [id number]        Your Vitals Were     Pulse Temperature Respirations Height Pulse Oximetry BMI (Body Mass Index)    90 96.9  F (36.1  C) (Temporal) 18 5' 6\" (1.676 m) 95% 28.25 kg/m2       Blood Pressure from Last 3 Encounters: "   08/15/18 120/80   07/05/18 114/68   03/17/18 119/69    Weight from Last 3 Encounters:   08/15/18 175 lb (79.4 kg)   07/05/18 180 lb 8 oz (81.9 kg)   03/18/18 160 lb (72.6 kg)              We Performed the Following     1st  Administration  [[id number]]     HPV High Risk Types DNA Cervical     Pap imaged thin layer screen with HPV - recommended age 30 - 65 years (select HPV order below)     TDAP VACCINE (ADACEL) [76042.002]        Primary Care Provider Office Phone # Fax #    Ratna Jacob PA-C [phone] [address] [place] [address]        Equal Access to Services     JESSICA MANCIA : Hadii aad ku hadasho Sobaljinderali, waaxda luqadaha, qaybta kaalmada adeegyada, alexsandra grijalva . So [facility] [phone].    ATENCIÓN: Si habla español, tiene a worley disposición servicios gratuitos de asistencia lingüística. [facility] [phone].    We comply with applicable federal civil rights laws and [place] laws. We do not discriminate on the basis of race, color, national origin, age, disability, sex, sexual orientation, or gender identity.            Thank you!     Thank you for choosing [facility]  for your care. Our goal is always to provide you with excellent care. Hearing back from our patients is one way we can continue to improve our services. Please take a few minutes to complete the written survey that you may receive in the mail after your visit with us. Thank you!             Your Updated Medication List - Protect others around you: Learn how to safely use, store and throw away your medicines at www.disposemymeds.org.          This list is accurate as of 8/15/18  2:56 PM.  Always use your most recent med list.                   Brand Name Dispense Instructions for use Diagnosis    CELEXA PO      Take 20 mg by mouth daily        TOPIRAMATE PO      Take 200 mg by mouth daily        vitamin D 2000 units tablet      Take 1 tablet by mouth daily

## 2018-08-15 NOTE — NURSING NOTE
Prior to injection verified patient identity using patient's name and date of birth.  Per orders of Ratna Jacob injection of Tdap  given by Brynn Argueta MA. Patient instructed to remain in clinic for 20 minutes afterwards and to report any adverse reaction to me immediately.         Screening Questionnaire for Adult Immunization    Are you sick today?   No   Do you have allergies to medications, food, a vaccine component or latex?   No   Have you ever had a serious reaction after receiving a vaccination?   No   Do you have a long-term health problem with heart disease, lung disease, asthma, kidney disease, metabolic disease (e.g. diabetes), anemia, or other blood disorder?   No   Do you have cancer, leukemia, HIV/AIDS, or any other immune system problem?   No   In the past 3 months, have you taken medications that affect  your immune system, such as prednisone, other steroids, or anticancer drugs; drugs for the treatment of rheumatoid arthritis, Crohn s disease, or psoriasis; or have you had radiation treatments?   No   Have you had a seizure, or a brain or other nervous system problem?   No   During the past year, have you received a transfusion of blood or blood     products, or been given immune (gamma) globulin or antiviral drug?   No   For women: Are you pregnant or is there a chance you could become        pregnant during the next month?   No   Have you received any vaccinations in the past 4 weeks?   No     Immunization questionnaire answers were all negative.           Screening performed by Lynn Argueta on 8/15/2018 at 2:30 PM.

## 2018-08-17 LAB
COPATH REPORT: NORMAL
PAP: NORMAL

## 2018-08-20 LAB
FINAL DIAGNOSIS: NORMAL
HPV HR 12 DNA CVX QL NAA+PROBE: NEGATIVE
HPV16 DNA SPEC QL NAA+PROBE: NEGATIVE
HPV18 DNA SPEC QL NAA+PROBE: NEGATIVE
SPECIMEN DESCRIPTION: NORMAL
SPECIMEN SOURCE CVX/VAG CYTO: NORMAL

## 2019-05-24 ENCOUNTER — TRANSFERRED RECORDS (OUTPATIENT)
Dept: HEALTH INFORMATION MANAGEMENT | Facility: CLINIC | Age: 39
End: 2019-05-24

## 2019-08-28 ENCOUNTER — TELEPHONE (OUTPATIENT)
Dept: FAMILY MEDICINE | Facility: OTHER | Age: 39
End: 2019-08-28

## 2019-08-28 NOTE — TELEPHONE ENCOUNTER
Patient is due for a PHQ-9.  Index start date:5/06/2019  Index end date:9/03/2019    Please call patient.

## 2019-08-28 NOTE — TELEPHONE ENCOUNTER
I have attempted to call the pt to update a PHQ-9. Unable to leave a message as mailbox is full. I will call back another time. Nereida Aguilar CMA ([facility])

## 2019-09-03 NOTE — TELEPHONE ENCOUNTER
Pt didn't return phone calls. PHQ-9 missed. I will close the encounter until further outreach. Nereida Aguilar CMA ([facility])

## 2019-09-28 ENCOUNTER — HEALTH MAINTENANCE LETTER (OUTPATIENT)
Age: 39
End: 2019-09-28

## 2019-10-21 DIAGNOSIS — Z79.899 HIGH RISK MEDICATION USE: ICD-10-CM

## 2019-10-21 DIAGNOSIS — Z13.21 ENCOUNTER FOR VITAMIN DEFICIENCY SCREENING: Primary | ICD-10-CM

## 2019-10-21 LAB
ALBUMIN SERPL-MCNC: 3.5 G/DL (ref 3.4–5)
ALP SERPL-CCNC: 70 U/L (ref 40–150)
ALT SERPL W P-5'-P-CCNC: 26 U/L (ref 0–50)
AMPHETAMINES UR QL: NOT DETECTED NG/ML
ANION GAP SERPL CALCULATED.3IONS-SCNC: 4 MMOL/L (ref 3–14)
AST SERPL W P-5'-P-CCNC: 12 U/L (ref 0–45)
BARBITURATES UR QL SCN: NOT DETECTED NG/ML
BASOPHILS # BLD AUTO: 0 10E9/L (ref 0–0.2)
BASOPHILS NFR BLD AUTO: 0.3 %
BENZODIAZ UR QL SCN: NOT DETECTED NG/ML
BILIRUB SERPL-MCNC: 0.3 MG/DL (ref 0.2–1.3)
BUN SERPL-MCNC: 9 MG/DL (ref 7–30)
BUPRENORPHINE UR QL: NOT DETECTED NG/ML
CALCIUM SERPL-MCNC: 8.4 MG/DL (ref 8.5–10.1)
CANNABINOIDS UR QL: NOT DETECTED NG/ML
CHLORIDE SERPL-SCNC: 105 MMOL/L (ref 94–109)
CO2 SERPL-SCNC: 30 MMOL/L (ref 20–32)
COCAINE UR QL SCN: NOT DETECTED NG/ML
CREAT SERPL-MCNC: 0.75 MG/DL (ref 0.52–1.04)
D-METHAMPHET UR QL: NOT DETECTED NG/ML
DIFFERENTIAL METHOD BLD: NORMAL
EOSINOPHIL # BLD AUTO: 0.1 10E9/L (ref 0–0.7)
EOSINOPHIL NFR BLD AUTO: 2.1 %
ERYTHROCYTE [DISTWIDTH] IN BLOOD BY AUTOMATED COUNT: 13.3 % (ref 10–15)
GFR SERPL CREATININE-BSD FRML MDRD: >90 ML/MIN/{1.73_M2}
GLUCOSE SERPL-MCNC: 103 MG/DL (ref 70–99)
HCT VFR BLD AUTO: 40.9 % (ref 35–47)
HGB BLD-MCNC: 13.6 G/DL (ref 11.7–15.7)
LYMPHOCYTES # BLD AUTO: 2.1 10E9/L (ref 0.8–5.3)
LYMPHOCYTES NFR BLD AUTO: 34.4 %
MCH RBC QN AUTO: 30.8 PG (ref 26.5–33)
MCHC RBC AUTO-ENTMCNC: 33.3 G/DL (ref 31.5–36.5)
MCV RBC AUTO: 93 FL (ref 78–100)
METHADONE UR QL SCN: NOT DETECTED NG/ML
MONOCYTES # BLD AUTO: 0.4 10E9/L (ref 0–1.3)
MONOCYTES NFR BLD AUTO: 6.9 %
NEUTROPHILS # BLD AUTO: 3.5 10E9/L (ref 1.6–8.3)
NEUTROPHILS NFR BLD AUTO: 56.3 %
OPIATES UR QL SCN: NOT DETECTED NG/ML
OXYCODONE UR QL SCN: NOT DETECTED NG/ML
PCP UR QL SCN: NOT DETECTED NG/ML
PLATELET # BLD AUTO: 246 10E9/L (ref 150–450)
POTASSIUM SERPL-SCNC: 3.8 MMOL/L (ref 3.4–5.3)
PROPOXYPH UR QL: NOT DETECTED NG/ML
PROT SERPL-MCNC: 7.3 G/DL (ref 6.8–8.8)
RBC # BLD AUTO: 4.42 10E12/L (ref 3.8–5.2)
SODIUM SERPL-SCNC: 139 MMOL/L (ref 133–144)
TRICYCLICS UR QL SCN: NOT DETECTED NG/ML
WBC # BLD AUTO: 6.1 10E9/L (ref 4–11)

## 2019-10-21 PROCEDURE — 36415 COLL VENOUS BLD VENIPUNCTURE: CPT | Performed by: NURSE PRACTITIONER

## 2019-10-21 PROCEDURE — 85025 COMPLETE CBC W/AUTO DIFF WBC: CPT | Performed by: NURSE PRACTITIONER

## 2019-10-21 PROCEDURE — 80053 COMPREHEN METABOLIC PANEL: CPT | Performed by: NURSE PRACTITIONER

## 2019-10-21 PROCEDURE — 82306 VITAMIN D 25 HYDROXY: CPT | Performed by: NURSE PRACTITIONER

## 2019-10-21 PROCEDURE — 80306 DRUG TEST PRSMV INSTRMNT: CPT | Performed by: NURSE PRACTITIONER

## 2019-10-22 LAB — DEPRECATED CALCIDIOL+CALCIFEROL SERPL-MC: 29 UG/L (ref 20–75)

## 2019-11-01 ENCOUNTER — OFFICE VISIT (OUTPATIENT)
Dept: FAMILY MEDICINE | Facility: OTHER | Age: 39
End: 2019-11-01
Payer: COMMERCIAL

## 2019-11-01 VITALS
BODY MASS INDEX: 29.04 KG/M2 | OXYGEN SATURATION: 97 % | HEART RATE: 92 BPM | SYSTOLIC BLOOD PRESSURE: 112 MMHG | RESPIRATION RATE: 16 BRPM | WEIGHT: 180.7 LBS | HEIGHT: 66 IN | TEMPERATURE: 98.2 F | DIASTOLIC BLOOD PRESSURE: 66 MMHG

## 2019-11-01 DIAGNOSIS — J01.00 ACUTE NON-RECURRENT MAXILLARY SINUSITIS: Primary | ICD-10-CM

## 2019-11-01 PROCEDURE — 99202 OFFICE O/P NEW SF 15 MIN: CPT | Performed by: INTERNAL MEDICINE

## 2019-11-01 RX ORDER — AZITHROMYCIN 250 MG/1
TABLET, FILM COATED ORAL
Qty: 6 TABLET | Refills: 0 | Status: SHIPPED | OUTPATIENT
Start: 2019-11-01 | End: 2019-11-06

## 2019-11-01 RX ORDER — LORATADINE 10 MG/1
10 TABLET ORAL DAILY
Qty: 30 TABLET | Refills: 0 | Status: SHIPPED | OUTPATIENT
Start: 2019-11-01 | End: 2021-06-10

## 2019-11-01 ASSESSMENT — PAIN SCALES - GENERAL: PAINLEVEL: SEVERE PAIN (6)

## 2019-11-01 ASSESSMENT — MIFFLIN-ST. JEOR: SCORE: 1516.4

## 2019-11-01 NOTE — LETTER
[facility]  [address] [facility] [address]  Phone: [phone]    November 1, 2019        Cecy Wilkerson  [address] [place] [id number]          To whom it may concern:    RE: Cecy Graciach    Patient was seen and treated today at our clinic and missed work.  Patient may return to work on Nov 5  without restriction  Please contact me for questions or concerns.      Sincerely,        Donovan Engle, DO

## 2019-11-01 NOTE — PROGRESS NOTES
Subjective     Cecy Wilkerson is a 39 year old female who presents to clinic today for the following health issues:    HPI   RESPIRATORY SYMPTOMS      Duration: 1.5-2 weeks     Description  nasal congestion, rhinorrhea, sore throat, cough, fever, ear pain bilateral and fatigue/malaise    History (predisposing factors):  Daughter was sick and prescribed steroids     Precipitating or alleviating factors: None                        Chief Complaint             Sick kids  Started off clear now is green and thick drainage  Mild pleuritic chest discomfort with respiration  Low-grade fever  Over-the-counter medication not helpful  Past history unremarkable as reviewed.  Exam shows nasal mucosa edematous and red  Tympanic membranes are red and retracted  Pharynx red without exudates  Lungs with scattered rhonchi  Good airflow  Heart regular  Neuro intact and unremarkable  Diagnosis: Acute sinusitis and pharyngitis with early bronchitis   plan: Zithromax, Claritin, ibuprofen 40 mg twice daily for 5 days, fluids, off work the rest of the day  Follow-up: As needed with PCP  Oniel

## 2020-03-05 DIAGNOSIS — Z79.899 ENCOUNTER FOR LONG-TERM (CURRENT) USE OF OTHER MEDICATIONS: Primary | ICD-10-CM

## 2020-03-05 LAB
PHQ9 SCORE: 3
T4 FREE SERPL-MCNC: 1.09 NG/DL (ref 0.76–1.46)
TSH SERPL DL<=0.005 MIU/L-ACNC: 1.3 MU/L (ref 0.4–4)

## 2020-03-05 PROCEDURE — 84439 ASSAY OF FREE THYROXINE: CPT | Performed by: NURSE PRACTITIONER

## 2020-03-05 PROCEDURE — 36415 COLL VENOUS BLD VENIPUNCTURE: CPT | Performed by: NURSE PRACTITIONER

## 2020-03-05 PROCEDURE — 84443 ASSAY THYROID STIM HORMONE: CPT | Performed by: NURSE PRACTITIONER

## 2020-08-05 ENCOUNTER — TRANSFERRED RECORDS (OUTPATIENT)
Dept: HEALTH INFORMATION MANAGEMENT | Facility: CLINIC | Age: 40
End: 2020-08-05

## 2020-09-29 ENCOUNTER — TRANSFERRED RECORDS (OUTPATIENT)
Dept: HEALTH INFORMATION MANAGEMENT | Facility: CLINIC | Age: 40
End: 2020-09-29

## 2020-10-29 ENCOUNTER — TRANSFERRED RECORDS (OUTPATIENT)
Dept: HEALTH INFORMATION MANAGEMENT | Facility: CLINIC | Age: 40
End: 2020-10-29

## 2021-01-09 ENCOUNTER — HEALTH MAINTENANCE LETTER (OUTPATIENT)
Age: 41
End: 2021-01-09

## 2021-06-10 ENCOUNTER — OFFICE VISIT (OUTPATIENT)
Dept: FAMILY MEDICINE | Facility: CLINIC | Age: 41
End: 2021-06-10
Payer: COMMERCIAL

## 2021-06-10 VITALS
TEMPERATURE: 97.9 F | SYSTOLIC BLOOD PRESSURE: 104 MMHG | BODY MASS INDEX: 34.66 KG/M2 | WEIGHT: 208 LBS | RESPIRATION RATE: 16 BRPM | DIASTOLIC BLOOD PRESSURE: 70 MMHG | HEIGHT: 65 IN | OXYGEN SATURATION: 98 % | HEART RATE: 79 BPM

## 2021-06-10 DIAGNOSIS — F39 MOOD DISORDER (H): ICD-10-CM

## 2021-06-10 DIAGNOSIS — R63.5 WEIGHT GAIN: Primary | ICD-10-CM

## 2021-06-10 DIAGNOSIS — R53.83 OTHER FATIGUE: ICD-10-CM

## 2021-06-10 LAB
ALBUMIN SERPL-MCNC: 3.7 G/DL (ref 3.4–5)
ALP SERPL-CCNC: 104 U/L (ref 40–150)
ALT SERPL W P-5'-P-CCNC: 29 U/L (ref 0–50)
ANION GAP SERPL CALCULATED.3IONS-SCNC: 4 MMOL/L (ref 3–14)
AST SERPL W P-5'-P-CCNC: 20 U/L (ref 0–45)
BILIRUB SERPL-MCNC: 0.3 MG/DL (ref 0.2–1.3)
BUN SERPL-MCNC: 8 MG/DL (ref 7–30)
CALCIUM SERPL-MCNC: 8.9 MG/DL (ref 8.5–10.1)
CHLORIDE SERPL-SCNC: 104 MMOL/L (ref 94–109)
CO2 SERPL-SCNC: 28 MMOL/L (ref 20–32)
CREAT SERPL-MCNC: 0.73 MG/DL (ref 0.52–1.04)
DEPRECATED CALCIDIOL+CALCIFEROL SERPL-MC: 25 UG/L (ref 20–75)
ERYTHROCYTE [DISTWIDTH] IN BLOOD BY AUTOMATED COUNT: 14.2 % (ref 10–15)
GFR SERPL CREATININE-BSD FRML MDRD: >90 ML/MIN/{1.73_M2}
GLUCOSE SERPL-MCNC: 99 MG/DL (ref 70–99)
HCT VFR BLD AUTO: 42.4 % (ref 35–47)
HGB BLD-MCNC: 13.9 G/DL (ref 11.7–15.7)
MCH RBC QN AUTO: 29.4 PG (ref 26.5–33)
MCHC RBC AUTO-ENTMCNC: 32.8 G/DL (ref 31.5–36.5)
MCV RBC AUTO: 90 FL (ref 78–100)
PLATELET # BLD AUTO: 290 10E9/L (ref 150–450)
POTASSIUM SERPL-SCNC: 4.1 MMOL/L (ref 3.4–5.3)
PROT SERPL-MCNC: 7.7 G/DL (ref 6.8–8.8)
RBC # BLD AUTO: 4.73 10E12/L (ref 3.8–5.2)
SODIUM SERPL-SCNC: 136 MMOL/L (ref 133–144)
TSH SERPL DL<=0.005 MIU/L-ACNC: 1.63 MU/L (ref 0.4–4)
WBC # BLD AUTO: 8.1 10E9/L (ref 4–11)

## 2021-06-10 PROCEDURE — 86039 ANTINUCLEAR ANTIBODIES (ANA): CPT | Performed by: PHYSICIAN ASSISTANT

## 2021-06-10 PROCEDURE — 99214 OFFICE O/P EST MOD 30 MIN: CPT | Performed by: PHYSICIAN ASSISTANT

## 2021-06-10 PROCEDURE — 86038 ANTINUCLEAR ANTIBODIES: CPT | Performed by: PHYSICIAN ASSISTANT

## 2021-06-10 PROCEDURE — 85027 COMPLETE CBC AUTOMATED: CPT | Performed by: PHYSICIAN ASSISTANT

## 2021-06-10 PROCEDURE — 36415 COLL VENOUS BLD VENIPUNCTURE: CPT | Performed by: PHYSICIAN ASSISTANT

## 2021-06-10 PROCEDURE — 80053 COMPREHEN METABOLIC PANEL: CPT | Performed by: PHYSICIAN ASSISTANT

## 2021-06-10 PROCEDURE — 82306 VITAMIN D 25 HYDROXY: CPT | Performed by: PHYSICIAN ASSISTANT

## 2021-06-10 PROCEDURE — 84443 ASSAY THYROID STIM HORMONE: CPT | Performed by: PHYSICIAN ASSISTANT

## 2021-06-10 ASSESSMENT — MIFFLIN-ST. JEOR: SCORE: 1618.32

## 2021-06-10 NOTE — PROGRESS NOTES
"    Nisa Sam is a 41 year old who presents for the following health issues    HPI     Weight gain- 60 pounds in 6 months  Numbness in arms  Heavier menstrual cycles  Dry skin  Retaining more fluid  Neck feels tight    Patient presents today for evaluation of weight gain and other symptoms that have been occurring over the last 6 months. She reports she is now over 200 lbs. Has not weighed this much ever. She reports her typical weight is around 150 lbs. She has been making dietary changes - not perfect but working at it and trying to get more exercise but the weight continues to increase. Feels more puffy like she is retaining fluid. Periods have been heavier. Neck feels tight but unsure if just related to her weight. She states much of her family is overweight. Diabetes and heart disease runs in her family. Very fearful of this.     Review of Systems   Constitutional, HEENT, cardiovascular, pulmonary, GI, , musculoskeletal, neuro, skin, endocrine and psych systems are negative, except as otherwise noted.      Objective    Pulse 79   Temp 97.9  F (36.6  C) (Temporal)   Ht 1.657 m (5' 5.25\")   Wt 94.3 kg (208 lb)   LMP 05/27/2021   SpO2 98%   BMI 34.35 kg/m    Body mass index is 34.35 kg/m .  Physical Exam   GENERAL: healthy, alert and no distress  EYES: Eyes grossly normal to inspection, PERRL and conjunctivae and sclerae normal  HENT: ear canals and TM's normal, nose and mouth without ulcers or lesions  NECK: no adenopathy, no asymmetry, masses, or scars and thyroid normal to palpation  RESP: lungs clear to auscultation - no rales, rhonchi or wheezes  CV: regular rate and rhythm, normal S1 S2, no S3 or S4, no murmur, click or rub, no peripheral edema and peripheral pulses strong  ABDOMEN: soft, nontender, no hepatosplenomegaly, no masses and bowel sounds normal  MS: no gross musculoskeletal defects noted, no edema  SKIN: no suspicious lesions or rashes  PSYCH: mentation appears normal, affect " normal/bright      Assessment & Plan     Weight gain  Will order labs as below to further evaluate symptoms present. Encouraged ongoing diet and exercise modifications. Follow-up pending results. May consider trial of weight loss medications if needed.   - CBC with platelets  - Comprehensive metabolic panel (BMP + Alb, Alk Phos, ALT, AST, Total. Bili, TP)  - TSH with free T4 reflex    Other fatigue  - CBC with platelets  - Comprehensive metabolic panel (BMP + Alb, Alk Phos, ALT, AST, Total. Bili, TP)  - TSH with free T4 reflex  - Vitamin D Deficiency  - Anti Nuclear Stefany IgG by IFA with Reflex    Mood disorder (H)  Stable.     The patient indicates understanding of these issues and agrees with the plan.    THALIA [facility]

## 2021-06-11 LAB
ANA PAT SER IF-IMP: ABNORMAL
ANA SER QL IF: POSITIVE
ANA TITR SER IF: ABNORMAL {TITER}

## 2021-06-13 DIAGNOSIS — R76.8 POSITIVE ANA (ANTINUCLEAR ANTIBODY): Primary | ICD-10-CM

## 2021-06-15 ENCOUNTER — TELEPHONE (OUTPATIENT)
Dept: FAMILY MEDICINE | Facility: CLINIC | Age: 41
End: 2021-06-15

## 2021-06-15 NOTE — TELEPHONE ENCOUNTER
Prior Authorization Retail Medication Request- [id number]    Medication/Dose: buPROPion (WELLBUTRIN XL) 300 MG 24 hr tablet  ICD code (if different than what is on RX):    Previously Tried and Failed:    Rationale:      Insurance Name:  [employer] MICHELLE   Insurance ID:  [id number]       Pharmacy Information (if different than what is on RX)  Name:    Phone:

## 2021-06-15 NOTE — TELEPHONE ENCOUNTER
Central Prior Authorization Team   Phone: [phone]      PA Initiation    Medication: buPROPion (WELLBUTRIN XL) 300 MG 24 hr tablet  Insurance Company: [employer] ([facility]) - Phone [phone] Fax [phone]  Pharmacy Filling the Rx: [id number] - [place], [place] - [address] [place] [address]  Filling Pharmacy Phone: [phone]  Filling Pharmacy Fax:    Start Date: 6/15/2021

## 2021-06-16 NOTE — TELEPHONE ENCOUNTER
Prior Authorization Approval    Authorization Effective Date: 6/15/2021  Authorization Expiration Date: 6/15/2022  Medication: buPROPion (WELLBUTRIN XL) 300 MG 24 hr tablet  Approved Dose/Quantity:    Reference #:     Insurance Company: [employer] ([facility]) - Phone [phone] Fax [phone]  Expected CoPay:       CoPay Card Available:      Foundation Assistance Needed:    Which Pharmacy is filling the prescription (Not needed for infusion/clinic administered): [id number] - [place], [place] - [address] [place] [address]  Pharmacy Notified: Yes  Patient Notified: Yes  **Instructed pharmacy to notify patient when script is ready to /ship.**

## 2021-08-06 ENCOUNTER — TELEPHONE (OUTPATIENT)
Dept: FAMILY MEDICINE | Facility: CLINIC | Age: 41
End: 2021-08-06

## 2021-08-06 NOTE — TELEPHONE ENCOUNTER
Prior Authorization Retail Medication Request- [id number]    Medication/Dose: phentermine (ADIPEX-P) 15 MG capsule  ICD code (if different than what is on RX):    Previously Tried and Failed:    Rationale:      Insurance Name:  [facility]  Insurance ID:  [id number]       Pharmacy Information (if different than what is on RX)  Name:    Phone:

## 2021-08-09 NOTE — TELEPHONE ENCOUNTER
Central Prior Authorization Team   Phone: [phone]      [place] Initiation    Medication: phentermine (ADIPEX-P) 15 MG capsule - INITIATED  Insurance Company: [employer] ([facility]) - Phone [phone] Fax [phone]  Pharmacy Filling the Rx: [id number] - [place], [place] - [address] [place] [address]  Filling Pharmacy Phone: [phone]  Filling Pharmacy Fax: [phone]  Start Date: 8/9/2021

## 2021-08-17 NOTE — TELEPHONE ENCOUNTER
Central Prior Authorization Team   Phone: [phone]      Prior Authorization Approval    Authorization Effective Date: 8/10/2021  Authorization Expiration Date: 2/9/2022  Medication: phentermine (ADIPEX-P) 15 MG capsule - APPROVED  Approved Dose/Quantity: 30 FOR 30  Reference #:     Insurance Company: [employer] ([facility]) - Phone [phone] Fax [phone]  Expected CoPay:       CoPay Card Available:      Foundation Assistance Needed:    Which Pharmacy is filling the prescription (Not needed for infusion/clinic administered): [id number] - [place], [place] - [address] [place] [address]  Pharmacy Notified: Yes  Patient Notified: Yes (**Instructed pharmacy to notify patient when script is ready to /ship.**)

## 2021-09-13 DIAGNOSIS — F39 MOOD DISORDER (H): ICD-10-CM

## 2021-09-13 DIAGNOSIS — R63.5 WEIGHT GAIN: ICD-10-CM

## 2021-09-14 RX ORDER — BUPROPION HYDROCHLORIDE 300 MG/1
300 TABLET ORAL EVERY MORNING
Qty: 90 TABLET | Refills: 0 | Status: SHIPPED | OUTPATIENT
Start: 2021-09-14 | End: 2021-10-11

## 2021-10-11 ENCOUNTER — OFFICE VISIT (OUTPATIENT)
Dept: FAMILY MEDICINE | Facility: CLINIC | Age: 41
End: 2021-10-11
Payer: COMMERCIAL

## 2021-10-11 VITALS
TEMPERATURE: 98 F | SYSTOLIC BLOOD PRESSURE: 126 MMHG | WEIGHT: 195 LBS | DIASTOLIC BLOOD PRESSURE: 72 MMHG | BODY MASS INDEX: 32.49 KG/M2 | HEIGHT: 65 IN | HEART RATE: 94 BPM | OXYGEN SATURATION: 93 % | RESPIRATION RATE: 16 BRPM

## 2021-10-11 DIAGNOSIS — F39 MOOD DISORDER (H): ICD-10-CM

## 2021-10-11 DIAGNOSIS — R63.5 WEIGHT GAIN: Primary | ICD-10-CM

## 2021-10-11 DIAGNOSIS — M72.2 PLANTAR FASCIITIS: ICD-10-CM

## 2021-10-11 DIAGNOSIS — R76.8 POSITIVE ANA (ANTINUCLEAR ANTIBODY): ICD-10-CM

## 2021-10-11 PROCEDURE — 36415 COLL VENOUS BLD VENIPUNCTURE: CPT | Performed by: PHYSICIAN ASSISTANT

## 2021-10-11 PROCEDURE — 99213 OFFICE O/P EST LOW 20 MIN: CPT | Performed by: PHYSICIAN ASSISTANT

## 2021-10-11 PROCEDURE — 86038 ANTINUCLEAR ANTIBODIES: CPT | Performed by: PHYSICIAN ASSISTANT

## 2021-10-11 PROCEDURE — 86039 ANTINUCLEAR ANTIBODIES (ANA): CPT | Performed by: PHYSICIAN ASSISTANT

## 2021-10-11 RX ORDER — PHENTERMINE HYDROCHLORIDE 15 MG/1
15 CAPSULE ORAL EVERY MORNING
Qty: 30 CAPSULE | Refills: 2 | Status: SHIPPED | OUTPATIENT
Start: 2021-10-11 | End: 2022-03-04

## 2021-10-11 RX ORDER — BUPROPION HYDROCHLORIDE 300 MG/1
300 TABLET ORAL EVERY MORNING
Qty: 90 TABLET | Refills: 1 | Status: SHIPPED | OUTPATIENT
Start: 2021-10-11 | End: 2022-03-04

## 2021-10-11 ASSESSMENT — MIFFLIN-ST. JEOR: SCORE: 1559.35

## 2021-10-11 ASSESSMENT — PAIN SCALES - GENERAL: PAINLEVEL: EXTREME PAIN (8)

## 2021-10-11 NOTE — PROGRESS NOTES
"  Nisa frankel is a 41 year old who presents for the following health issues    HPI     Medication Followup of Phentermine    Taking Medication as prescribed: yes    Side Effects:  None    Medication Helping Symptoms:  Yes    Patient presents today for follow-up her weight. She was started on Phentermine about 2 months ago and has lost 13 pounds in that time frame. She denies any side effects. Feels good about the recent weight loss. Would like to continue with this. Has been eating a low carb diet.     Moods well controlled with current dose of Wellbutrin.    Both feet have been really painful. Worse in the morning and at night. She reports most pain in the mid-foot and heel area. Bought new shoes, braces for plantar fasciitis and has been doing stretching with minimal improvement. Was hoping things would improve with the weight loss.        Review of Systems   Constitutional, HEENT, cardiovascular, pulmonary, GI, , musculoskeletal, neuro, skin, endocrine and psych systems are negative, except as otherwise noted.      Objective    /72   Pulse 94   Temp 98  F (36.7  C) (Temporal)   Resp 16   Ht 1.657 m (5' 5.25\")   Wt 88.5 kg (195 lb)   LMP  (LMP Unknown)   SpO2 93%   BMI 32.20 kg/m    Body mass index is 32.2 kg/m .  Physical Exam   GENERAL: healthy, alert and no distress  NECK: no adenopathy, no asymmetry, masses, or scars and thyroid normal to palpation  RESP: lungs clear to auscultation - no rales, rhonchi or wheezes  CV: regular rate and rhythm, normal S1 S2, no S3 or S4, no murmur, click or rub, no peripheral edema and peripheral pulses strong  MS: Tenderness of both feel at insertion point of plantar fascia  SKIN: no suspicious lesions or rashes  PSYCH: mentation appears normal, affect normal/bright        Assessment & Plan     Weight gain  Patient has been losing weight - down 13 pounds in 2 months. Tolerating Phentermine well. Blood pressure and pulse stable. Will continue at this time. " Encouraged ongoing diet and exercise modifications. Recheck in 3 months.   - buPROPion (WELLBUTRIN XL) 300 MG 24 hr tablet; Take 1 tablet (300 mg) by mouth every morning  - phentermine (ADIPEX-P) 15 MG capsule; Take 1 capsule (15 mg) by mouth every morning    Mood disorder (H)  Stable with current dose of Wellbutrin.   - buPROPion (WELLBUTRIN XL) 300 MG 24 hr tablet; Take 1 tablet (300 mg) by mouth every morning    Plantar fasciitis  Referral to podiatry given ongoing symptoms despite supportive cares.   - Orthopedic  Referral; Future    Positive LIUDMILA (antinuclear antibody)  - Anti Nuclear Stefany IgG by IFA with Reflex    The patient indicates understanding of these issues and agrees with the plan.    THALIA Armstrong [facility]

## 2021-10-12 LAB
ANA PAT SER IF-IMP: ABNORMAL
ANA SER QL IF: POSITIVE
ANA TITR SER IF: ABNORMAL {TITER}

## 2021-10-23 ENCOUNTER — HEALTH MAINTENANCE LETTER (OUTPATIENT)
Age: 41
End: 2021-10-23

## 2021-11-08 ENCOUNTER — ANCILLARY PROCEDURE (OUTPATIENT)
Dept: GENERAL RADIOLOGY | Facility: CLINIC | Age: 41
End: 2021-11-08
Attending: PODIATRIST
Payer: COMMERCIAL

## 2021-11-08 ENCOUNTER — OFFICE VISIT (OUTPATIENT)
Dept: PODIATRY | Facility: CLINIC | Age: 41
End: 2021-11-08
Attending: PHYSICIAN ASSISTANT
Payer: COMMERCIAL

## 2021-11-08 VITALS
HEIGHT: 65 IN | DIASTOLIC BLOOD PRESSURE: 76 MMHG | WEIGHT: 188 LBS | SYSTOLIC BLOOD PRESSURE: 110 MMHG | BODY MASS INDEX: 31.32 KG/M2

## 2021-11-08 DIAGNOSIS — M72.2 PLANTAR FASCIITIS: ICD-10-CM

## 2021-11-08 DIAGNOSIS — M72.2 PLANTAR FASCIITIS: Primary | ICD-10-CM

## 2021-11-08 PROCEDURE — 73630 X-RAY EXAM OF FOOT: CPT | Mod: TC | Performed by: RADIOLOGY

## 2021-11-08 PROCEDURE — 99203 OFFICE O/P NEW LOW 30 MIN: CPT | Performed by: PODIATRIST

## 2021-11-08 RX ORDER — DICLOFENAC SODIUM 75 MG/1
75 TABLET, DELAYED RELEASE ORAL 2 TIMES DAILY
Qty: 60 TABLET | Refills: 1 | Status: SHIPPED | OUTPATIENT
Start: 2021-11-08 | End: 2022-01-11

## 2021-11-08 ASSESSMENT — PAIN SCALES - GENERAL: PAINLEVEL: SEVERE PAIN (7)

## 2021-11-08 ASSESSMENT — MIFFLIN-ST. JEOR: SCORE: 1527.6

## 2021-11-08 NOTE — PATIENT INSTRUCTIONS
PLANTAR FASCIITIS  The  plantar fascia  is a thick fibrous layer of tissue that covers the bones on the bottom of your foot. It supports the foot bones in an arched position.  Plantar fasciitis  is a painful inflammation of the plantar fascia due to overuse. This can develop gradually or suddenly. It usually affects one foot at a time but can affect both feet. Heel pain can be sharp and feel like a knife sticking in the bottom of your foot. Pain may occur after exercising, long distance jogging, stair climbing, long periods of standing, or after getting up from a seated position.  Risk factors include arthritis, diabetes, obesity or recent weight gain, flat-foot, high arch, wearing high heels or loose shoes or shoes with poor arch support.  Sudden changes in activity or shoe gear may contribute to symptoms.  Foot pain from this condition is usually worse in the morning and improves with walking. By the end of the day there may be a dull aching. Treatment requires improved support of feet, short-term rest and controlling inflammation. It may take up to nine months before all symptoms go away with the measures described below.  A steroid injection into the foot, or surgery may be needed if this is becomes long standing or severe.  HOME CARE  1. If you are overweight, lose weight to promote healing.  2. Choose supportive shoes (stiff through the shank) with good arch support and shock absorbency. Replace athletic shoes when they become worn out. Don t walk or run barefoot.  3. Shoe inserts are an important part of treatment. You can buy off-the-shelf shoe inserts inexpensively such as [employer].  The best ones are custom molded to your foot with a prescription.  4. Night splints keep the plantar fascia gently stretched while you sleep and will eliminate morning pain. Wear it ALL NIGHT EVERY NIGHT, or any time you sit for a long time.  5. Reduce by 10% or more the activities that stress the feet: jogging, prolonged  standing or walking, high impact sports, etc.  6. Stretch your feet. Gently flex your ankle by leaning into a wall or counter or drop your heel from a step.  Stretch two minutes of every hour you are awake.  7. Icing or massage may help heel pain. Apply an ice pack or frozen water or Coke bottle to the heel for 10-20 minutes as a preventive or after an acute flare of symptoms. You may repeat this as needed.   Follow up with your Doctor in 3 weeks as instructed.    Reliable shoe stores: To maximize your experience and provide the best possible fit.  Be sure to show them your foot concerns and tell them Dr. Corbett sent you.      Stores listed in bold have only athletic shoes, and stores that are not bold are mostly casual or variety of shoes    [employer]  [address] [place] [address]  [phone]     [employer] [facility]  [address] [place] [address]  [phone]     [employer]  [address] [place] [address]  [phone]    [employer]  [address] [facility] [address]  [phone]    [employer]  [address] [place], [place] [address] [phone]    [employer]  [address] [place] [address]  [phone]                         [employer] Locations:     [address] [place] [address]   [phone]     [address] [place] [address]   [phone]     [address] [place] [address]   [phone]     [address] [place] [address]   [phone]     [address] [place] [address]   [phone]     [address] [place] [address]   [phone]     [address] [facility] [address]   [place] [address]   [phone]     [address]   [place], [place] [id number] [phone]             How to find reasonable shoes          The correct width    Correct Fitting    Correct Length      Foot Distortion    Posture Distortion                           Torsional Rigidity      Grasp behind the heel and underneath the foot and twist      Bad    Excessive torsion/twist in midfoot     Less torsion/twist in midfoot is better                   Heel Counter Rigidity      Grasp just above   midsole and squeeze      Bad    Soft heel counter      Good    Rigid Heel Counter      Flexion Rigidity      Grasp shoe and bend from forefoot to rearfoot

## 2021-11-08 NOTE — PROGRESS NOTES
HPI:  Cecy Wilkerson is a 41 year old female who is seen in consultation at the request of Kyleigh Nichols PA-C    Pt presents for eval of:   (Onset, Location, L/R, Character, Treatments, Injury if yes)    XR Left and Right foot 2021    Positive Anti Nuclear Stefany IgG by IFA Reflex, 10/11/2021, 6/10/2021 - See Rheumatology 2022.     Onset May 2020, plantar Right > Left heel/arch pain. No injury noted. Presents today with supportive athletic shoes.  Constant, dull ache, swelling. Sharp, throbbing, tightness with first steps after lying or sitting, pain 7/10. Pain decreases with some activity.  Wearing compression socks, supportive athletic shoes. Ibuprofen.    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      ROS: 10 point ROS neg other than the symptoms noted above in the HPI.    Patient Active Problem List   Diagnosis     CARDIOVASCULAR SCREENING; LDL GOAL LESS THAN 160     H/O:      Generalized anxiety disorder     Generalized hyperhidrosis     External hemorrhoids     Attention deficit hyperactivity disorder (ADHD), combined type     Manic affective disorder with recurrent episode (H)       PAST MEDICAL HISTORY:   Past Medical History:   Diagnosis Date     Anxiety      Infectious mononucleosis      Presence of intrauterine contraceptive device 11    Mirena placed 11/ removed 13     Varicella without mention of complication     Had twice as a child.     PAST SURGICAL HISTORY:   Past Surgical History:   Procedure Laterality Date      SECTION  12/15/2010     SECTION performed by ROSA HYMAN at  [facility]     COLONOSCOPY N/A 2015    Procedure: COMBINED COLONOSCOPY, SINGLE OR MULTIPLE BIOPSY/POLYPECTOMY BY BIOPSY;  Surgeon: Zenon Jay MD;  Location:  GI     ESOPHAGOSCOPY, GASTROSCOPY, DUODENOSCOPY (EGD), COMBINED N/A 2015    Procedure: COMBINED ESOPHAGOSCOPY, GASTROSCOPY, DUODENOSCOPY (EGD), BIOPSY SINGLE OR MULTIPLE;  Surgeon: Pierre  MD Zenon;  Location:  GI     EXCISE LESN NECK/CHEST,SUBCUTAN  childhood    remove some lumps from the neck as a child     HEMORRHOIDECTOMY EXTERNAL N/A 2015    Procedure: HEMORRHOIDECTOMY EXTERNAL;  Surgeon: Jose Potter MD;  Location:  OR     MEDICATIONS:   Current Outpatient Medications:      buPROPion (WELLBUTRIN XL) 300 MG 24 hr tablet, Take 1 tablet (300 mg) by mouth every morning, Disp: 90 tablet, Rfl: 1     diclofenac (VOLTAREN) 75 MG EC tablet, Take 1 tablet (75 mg) by mouth 2 times daily, Disp: 60 tablet, Rfl: 1     phentermine (ADIPEX-P) 15 MG capsule, Take 1 capsule (15 mg) by mouth every morning, Disp: 30 capsule, Rfl: 2  ALLERGIES:    Allergies   Allergen Reactions     Nkda [No Known Drug Allergies]      No Known Allergies      SOCIAL HISTORY:   Social History     Socioeconomic History     Marital status:      Spouse name: Blayne     Number of children: 1     Years of education: Not on file     Highest education level: Not on file   Occupational History     Employer: [employer]   Tobacco Use     Smoking status: Former Smoker     Packs/day: 1.00     Years: 3.00     Pack years: 3.00     Quit date: 2005     Years since quittin.5     Smokeless tobacco: Never Used   Vaping Use     Vaping Use: Never used   Substance and Sexual Activity     Alcohol use: Yes     Comment: 1 drink every 2 weeks or so     Drug use: No     Sexual activity: Yes     Partners: Male     Birth control/protection: Surgical     Comment: vasectomy   Other Topics Concern     Parent/sibling w/ CABG, MI or angioplasty before 65F 55M? 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  No smokers in the home.  Cecy's dad smokes - outside only.  No concerns about domestic violence.  Has indoor cat/kittens.     Social Determinants of Health     Financial Resource Strain: Not on file   Food Insecurity: Not on file   Transportation Needs: Not on file   Physical Activity: Not on file   Stress: Not on file   Social Connections: Not on file   Intimate Partner Violence: Not on file   Housing Stability: Not on file     FAMILY HISTORY:   Family History   Problem Relation Age of Onset     Allergies Mother         bees and grass,pollens     Respiratory Mother         asthma     Alcohol/Drug Father         Simone's father     Alcohol/Drug Sister         Simone's sister     Alcohol/Drug Brother         Simone's brother     Allergies Brother         Grass and pollens.     Diabetes Maternal Grandmother         Adult onset     Heart Disease Maternal Grandmother      Hypertension Maternal Grandmother      Lipids Maternal Grandmother      Neurologic Disorder Brother      Respiratory Brother         asthma     Cerebrovascular Disease Paternal Grandmother         CVa age 35     Obesity Maternal Aunt      Obesity Maternal Uncle      Gastrointestinal Disease Other         Crohn's first cousin       EXAM:Vitals: /76 (BP Location: Left arm, Patient Position: Sitting, Cuff Size: Adult Large)   Ht 1.657 m (5' 5.25\")   Wt 85.3 kg (188 lb)   LMP  (LMP Unknown)   BMI 31.05 kg/m    BMI= Body mass index is 31.05 kg/m .    General appearance: Patient is alert and fully cooperative with history & exam.  No sign of distress is noted during the visit.     Psychiatric: Affect is pleasant & appropriate.  Patient appears motivated to improve health.     Respiratory: Breathing is regular & unlabored while sitting.     HEENT: Hearing is intact to spoken word.  Speech is " clear.  No gross evidence of visual impairment that would impact ambulation.     Vascular: DP & PT 2/4 & regular bilaterally.  No significant edema, rubor or varicosities noted.  CFT and skin temperature is normal to both lower extremities.       Neurologic: Lower extremity sensation is intact to light touch.  No evidence of weakness in the lower extremities.  No evidence of neuropathy and negative tinel sign.     Dermatologic: Skin is intact to both lower extremities without significant lesions, rash or abrasion.  Normal texture turgor and tone. No paronychia or evidence of soft tissue infection is noted.   Musculoskeletal: Patient is ambulatory without assistive device or brace. Pain is noted with firm palpation along the medial band of the plantar fascia bilateral foot most notably through the medial arch not upon the origination of the calcaneus.  No pain with compression of the calcaneus medial to lateral or with palpation of the achilles, peroneal or posterior tibial tendons.  Slightly more than 0  of ankle joint dorsiflexion without crepitus or pain throughout the ankle, subtalar or midtarsal joints.  No pain or limitations throughout manual muscle strength testing plus 5/5 to all four quadrants bilateral.  No palpable edema noted.      Radiographs: Osteophyte at the insertion of the Achilles tendon.  Forefoot supinatus.      ASSESSMENT:       ICD-10-CM    1. Plantar fasciitis  M72.2 XR Foot Bilateral G/E 3 Views     Orthopedic  Referral     diclofenac (VOLTAREN) 75 MG EC tablet     Orthotics, Prosthetics and Custom Compression Order for DME - ONLY FOR DME     Ankle/Foot Bracing Supplies Order for DME - ONLY FOR DME       PLAN:  Reviewed patient's chart in [facility] and discussed etiology and treatment options.      Treatments:  11/8/2021  Obtained and interpreted radiographs.   Discontinue barefoot walking or unsupported walking in shoes without shank.  Dispensed written instructions to obtain  appropriate shoe gear and/or OTC inserts.  Dispensed anterior night splint to use all night every night.  Prescription oral Voltaren for a short course. Discussed risks.  Prescription for custom molded orthotics 11/8/2021  Follow up in 4-5 weeks     No work restrictions as  and     Jordin Corbett DPM

## 2021-11-08 NOTE — LETTER
2021         RE: Cecy Wilkerson  [address] [facility] [address]        Dear Colleague,    Thank you for referring your patient, Cecy Wilkerson, to the [facility]. Please see a copy of my visit note below.    HPI:  Cecy Wilkerson is a 41 year old female who is seen in consultation at the request of Kyleigh Nichols PA-C    Pt presents for eval of:   (Onset, Location, L/R, Character, Treatments, Injury if yes)    XR Left and Right foot 2021    Positive Anti Nuclear Stefany IgG by IFA Reflex, 10/11/2021, 6/10/2021 - See Rheumatology 2022.     Onset May 2020, plantar Right > Left heel/arch pain. No injury noted. Presents today with supportive athletic shoes.  Constant, dull ache, swelling. Sharp, throbbing, tightness with first steps after lying or sitting, pain 7/10. Pain decreases with some activity.  Wearing compression socks, supportive athletic shoes. Ibuprofen.    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      ROS: 10 point ROS neg other than the symptoms noted above in the HPI.    Patient Active Problem List   Diagnosis     CARDIOVASCULAR SCREENING; LDL GOAL LESS THAN 160     H/O:      Generalized anxiety disorder     Generalized hyperhidrosis     External hemorrhoids     Attention deficit hyperactivity disorder (ADHD), combined type     Manic affective disorder with recurrent episode (H)       PAST MEDICAL HISTORY:   Past Medical History:   Diagnosis Date     Anxiety      Infectious mononucleosis      Presence of intrauterine contraceptive device 11    Mirena placed 11/ removed 13     Varicella without mention of complication     Had twice as a child.     PAST SURGICAL HISTORY:   Past Surgical History:   Procedure Laterality Date      SECTION  12/15/2010     SECTION performed by ROSA HYMAN at [facility]     COLONOSCOPY N/A 2015    Procedure: COMBINED COLONOSCOPY, SINGLE OR MULTIPLE BIOPSY/POLYPECTOMY BY BIOPSY;   Surgeon: Zenon Jay MD;  Location: [facility]     ESOPHAGOSCOPY, GASTROSCOPY, DUODENOSCOPY (EGD), COMBINED N/A 2015    Procedure: COMBINED ESOPHAGOSCOPY, GASTROSCOPY, DUODENOSCOPY (EGD), BIOPSY SINGLE OR MULTIPLE;  Surgeon: Zenon Jay MD;  Location: [facility]     EXCISE LESN NECK/CHEST,SUBCUTAN  childhood    remove some lumps from the neck as a child     HEMORRHOIDECTOMY EXTERNAL N/A 2015    Procedure: HEMORRHOIDECTOMY EXTERNAL;  Surgeon: Jose Potter MD;  Location: [facility]     MEDICATIONS:   Current Outpatient Medications:      buPROPion (WELLBUTRIN XL) 300 MG 24 hr tablet, Take 1 tablet (300 mg) by mouth every morning, Disp: 90 tablet, Rfl: 1     diclofenac (VOLTAREN) 75 MG EC tablet, Take 1 tablet (75 mg) by mouth 2 times daily, Disp: 60 tablet, Rfl: 1     phentermine (ADIPEX-P) 15 MG capsule, Take 1 capsule (15 mg) by mouth every morning, Disp: 30 capsule, Rfl: 2  ALLERGIES:    Allergies   Allergen Reactions     Nkda [No Known Drug Allergies]      No Known Allergies      SOCIAL HISTORY:   Social History     Socioeconomic History     Marital status:      Spouse name: Blayne     Number of children: 1     Years of education: Not on file     Highest education level: Not on file   Occupational History     Employer: [employer]   Tobacco Use     Smoking status: Former Smoker     Packs/day: 1.00     Years: 3.00     Pack years: 3.00     Quit date: 2005     Years since quittin.5     Smokeless tobacco: Never Used   Vaping Use     Vaping Use: Never used   Substance and Sexual Activity     Alcohol use: Yes     Comment: 1 drink every 2 weeks or so     Drug use: No     Sexual activity: Yes     Partners: Male     Birth control/protection: Surgical     Comment: vasectomy   Other Topics Concern     Parent/sibling w/ CABG, MI or angioplasty before 65F 55M? No      Service No     Blood Transfusions No     Caffeine Concern Yes     Occupational Exposure Yes     Comment: [employer]  "Hazards No     Sleep Concern No     Stress Concern No     Weight Concern No     Special Diet No     Back Care Yes     Comment: discussed lifting/back care     Exercise Yes     Comment: Advised walking 30 minutes/day     Bike Helmet Not Asked     Seat Belt Yes     Self-Exams Not Asked   Social History Narrative     and lives in [place] with S.Blayne MERCER, patient's daughter, Rea and patient's dad.  No smokers in the home.  Cecy's dad smokes - outside only.  No concerns about domestic violence.  Has indoor cat/kittens.     Social Determinants of Health     Financial Resource Strain: Not on file   Food Insecurity: Not on file   Transportation Needs: Not on file   Physical Activity: Not on file   Stress: Not on file   Social Connections: Not on file   Intimate Partner Violence: Not on file   Housing Stability: Not on file     FAMILY HISTORY:   Family History   Problem Relation Age of Onset     Allergies Mother         bees and grass,pollens     Respiratory Mother         asthma     Alcohol/Drug Father         Simone's father     Alcohol/Drug Sister         Simone's sister     Alcohol/Drug Brother         Simone's brother     Allergies Brother         Grass and pollens.     Diabetes Maternal Grandmother         Adult onset     Heart Disease Maternal Grandmother      Hypertension Maternal Grandmother      Lipids Maternal Grandmother      Neurologic Disorder Brother      Respiratory Brother         asthma     Cerebrovascular Disease Paternal Grandmother         CVa age 35     Obesity Maternal Aunt      Obesity Maternal Uncle      Gastrointestinal Disease Other         Crohn's first cousin       EXAM:Vitals: /76 (BP Location: Left arm, Patient Position: Sitting, Cuff Size: Adult Large)   Ht 1.657 m (5' 5.25\")   Wt 85.3 kg (188 lb)   LMP  (LMP Unknown)   BMI 31.05 kg/m    BMI= Body mass index is 31.05 kg/m .    General appearance: Patient is alert and fully cooperative with history & exam.  No sign of distress is " noted during the visit.     Psychiatric: Affect is pleasant & appropriate.  Patient appears motivated to improve health.     Respiratory: Breathing is regular & unlabored while sitting.     HEENT: Hearing is intact to spoken word.  Speech is clear.  No gross evidence of visual impairment that would impact ambulation.     Vascular: DP & PT 2/4 & regular bilaterally.  No significant edema, rubor or varicosities noted.  CFT and skin temperature is normal to both lower extremities.       Neurologic: Lower extremity sensation is intact to light touch.  No evidence of weakness in the lower extremities.  No evidence of neuropathy and negative tinel sign.     Dermatologic: Skin is intact to both lower extremities without significant lesions, rash or abrasion.  Normal texture turgor and tone. No paronychia or evidence of soft tissue infection is noted.   Musculoskeletal: Patient is ambulatory without assistive device or brace. Pain is noted with firm palpation along the medial band of the plantar fascia bilateral foot most notably through the medial arch not upon the origination of the calcaneus.  No pain with compression of the calcaneus medial to lateral or with palpation of the achilles, peroneal or posterior tibial tendons.  Slightly more than 0  of ankle joint dorsiflexion without crepitus or pain throughout the ankle, subtalar or midtarsal joints.  No pain or limitations throughout manual muscle strength testing plus 5/5 to all four quadrants bilateral.  No palpable edema noted.      Radiographs: Osteophyte at the insertion of the Achilles tendon.  Forefoot supinatus.      ASSESSMENT:       ICD-10-CM    1. Plantar fasciitis  M72.2 XR Foot Bilateral G/E 3 Views     Orthopedic  Referral     diclofenac (VOLTAREN) 75 MG EC tablet     Orthotics, Prosthetics and Custom Compression Order for DME - ONLY FOR DME     Ankle/Foot Bracing Supplies Order for DME - ONLY FOR DME       PLAN:  Reviewed patient's chart in epic  and discussed etiology and treatment options.      Treatments:  11/8/2021  Obtained and interpreted radiographs.   Discontinue barefoot walking or unsupported walking in shoes without shank.  Dispensed written instructions to obtain appropriate shoe gear and/or OTC inserts.  Dispensed anterior night splint to use all night every night.  Prescription oral Voltaren for a short course. Discussed risks.  Prescription for custom molded orthotics 11/8/2021  Follow up in 4-5 weeks     No work restrictions as  and     Jordin Corbett DPM              Again, thank you for allowing me to participate in the care of your patient.        Sincerely,        Jordin Corbett DPM

## 2021-11-08 NOTE — NURSING NOTE
Dispensed 1 Dorsal (Anterior) Night Splint, Size S/M, with [facility] agreement signed by patient. Lianne Lynch CMA, November 8, 2021

## 2021-11-23 ENCOUNTER — APPOINTMENT (OUTPATIENT)
Dept: ULTRASOUND IMAGING | Facility: CLINIC | Age: 41
End: 2021-11-23
Attending: EMERGENCY MEDICINE
Payer: COMMERCIAL

## 2021-11-23 ENCOUNTER — HOSPITAL ENCOUNTER (EMERGENCY)
Facility: CLINIC | Age: 41
Discharge: HOME | End: 2021-11-23
Attending: EMERGENCY MEDICINE | Admitting: EMERGENCY MEDICINE
Payer: COMMERCIAL

## 2021-11-23 VITALS
OXYGEN SATURATION: 100 % | SYSTOLIC BLOOD PRESSURE: 120 MMHG | DIASTOLIC BLOOD PRESSURE: 81 MMHG | TEMPERATURE: 97.4 F | HEART RATE: 68 BPM | RESPIRATION RATE: 16 BRPM | BODY MASS INDEX: 30.88 KG/M2 | WEIGHT: 187 LBS

## 2021-11-23 DIAGNOSIS — R10.9 NONSPECIFIC ABDOMINAL PAIN: ICD-10-CM

## 2021-11-23 LAB
ALBUMIN SERPL-MCNC: 3.3 G/DL (ref 3.4–5)
ALBUMIN UR-MCNC: NEGATIVE MG/DL
ALP SERPL-CCNC: 73 U/L (ref 40–150)
ALT SERPL W P-5'-P-CCNC: 19 U/L (ref 0–50)
ANION GAP SERPL CALCULATED.3IONS-SCNC: 3 MMOL/L (ref 3–14)
APPEARANCE UR: CLEAR
AST SERPL W P-5'-P-CCNC: 11 U/L (ref 0–45)
BACTERIA #/AREA URNS HPF: ABNORMAL /HPF
BASOPHILS # BLD AUTO: 0 10E3/UL (ref 0–0.2)
BASOPHILS NFR BLD AUTO: 1 %
BILIRUB SERPL-MCNC: 0.3 MG/DL (ref 0.2–1.3)
BILIRUB UR QL STRIP: NEGATIVE
BUN SERPL-MCNC: 6 MG/DL (ref 7–30)
CALCIUM SERPL-MCNC: 8.1 MG/DL (ref 8.5–10.1)
CHLORIDE BLD-SCNC: 110 MMOL/L (ref 94–109)
CO2 SERPL-SCNC: 27 MMOL/L (ref 20–32)
COLOR UR AUTO: YELLOW
CREAT SERPL-MCNC: 0.73 MG/DL (ref 0.52–1.04)
EOSINOPHIL # BLD AUTO: 0.1 10E3/UL (ref 0–0.7)
EOSINOPHIL NFR BLD AUTO: 1 %
ERYTHROCYTE [DISTWIDTH] IN BLOOD BY AUTOMATED COUNT: 13.8 % (ref 10–15)
GFR SERPL CREATININE-BSD FRML MDRD: >90 ML/MIN/1.73M2
GLUCOSE BLD-MCNC: 105 MG/DL (ref 70–99)
GLUCOSE UR STRIP-MCNC: NEGATIVE MG/DL
HCG SERPL QL: NEGATIVE
HCT VFR BLD AUTO: 39.5 % (ref 35–47)
HGB BLD-MCNC: 12.7 G/DL (ref 11.7–15.7)
HGB UR QL STRIP: NEGATIVE
IMM GRANULOCYTES # BLD: 0 10E3/UL
IMM GRANULOCYTES NFR BLD: 0 %
KETONES UR STRIP-MCNC: NEGATIVE MG/DL
LEUKOCYTE ESTERASE UR QL STRIP: NEGATIVE
LIPASE SERPL-CCNC: 64 U/L (ref 73–393)
LYMPHOCYTES # BLD AUTO: 2 10E3/UL (ref 0.8–5.3)
LYMPHOCYTES NFR BLD AUTO: 36 %
MCH RBC QN AUTO: 29.8 PG (ref 26.5–33)
MCHC RBC AUTO-ENTMCNC: 32.2 G/DL (ref 31.5–36.5)
MCV RBC AUTO: 93 FL (ref 78–100)
MONOCYTES # BLD AUTO: 0.4 10E3/UL (ref 0–1.3)
MONOCYTES NFR BLD AUTO: 7 %
NEUTROPHILS # BLD AUTO: 3.1 10E3/UL (ref 1.6–8.3)
NEUTROPHILS NFR BLD AUTO: 55 %
NITRATE UR QL: NEGATIVE
NRBC # BLD AUTO: 0 10E3/UL
NRBC BLD AUTO-RTO: 0 /100
PH UR STRIP: 7 [PH] (ref 5–7)
PLATELET # BLD AUTO: 260 10E3/UL (ref 150–450)
POTASSIUM BLD-SCNC: 4.1 MMOL/L (ref 3.4–5.3)
PROT SERPL-MCNC: 7 G/DL (ref 6.8–8.8)
RBC # BLD AUTO: 4.26 10E6/UL (ref 3.8–5.2)
RBC URINE: <1 /HPF
SODIUM SERPL-SCNC: 140 MMOL/L (ref 133–144)
SP GR UR STRIP: 1 (ref 1–1.03)
SQUAMOUS EPITHELIAL: 3 /HPF
UROBILINOGEN UR STRIP-MCNC: NORMAL MG/DL
WBC # BLD AUTO: 5.5 10E3/UL (ref 4–11)
WBC URINE: 1 /HPF

## 2021-11-23 PROCEDURE — 81001 URINALYSIS AUTO W/SCOPE: CPT | Performed by: FAMILY MEDICINE

## 2021-11-23 PROCEDURE — 99284 EMERGENCY DEPT VISIT MOD MDM: CPT | Performed by: EMERGENCY MEDICINE

## 2021-11-23 PROCEDURE — 80053 COMPREHEN METABOLIC PANEL: CPT | Performed by: EMERGENCY MEDICINE

## 2021-11-23 PROCEDURE — 99285 EMERGENCY DEPT VISIT HI MDM: CPT | Mod: 25 | Performed by: EMERGENCY MEDICINE

## 2021-11-23 PROCEDURE — 76705 ECHO EXAM OF ABDOMEN: CPT

## 2021-11-23 PROCEDURE — 84703 CHORIONIC GONADOTROPIN ASSAY: CPT | Performed by: EMERGENCY MEDICINE

## 2021-11-23 PROCEDURE — 96374 THER/PROPH/DIAG INJ IV PUSH: CPT | Performed by: EMERGENCY MEDICINE

## 2021-11-23 PROCEDURE — 250N000011 HC RX IP 250 OP 636: Performed by: EMERGENCY MEDICINE

## 2021-11-23 PROCEDURE — 99284 EMERGENCY DEPT VISIT MOD MDM: CPT | Mod: 25 | Performed by: EMERGENCY MEDICINE

## 2021-11-23 PROCEDURE — 85025 COMPLETE CBC W/AUTO DIFF WBC: CPT | Performed by: EMERGENCY MEDICINE

## 2021-11-23 PROCEDURE — 83690 ASSAY OF LIPASE: CPT | Performed by: EMERGENCY MEDICINE

## 2021-11-23 PROCEDURE — 81001 URINALYSIS AUTO W/SCOPE: CPT | Performed by: EMERGENCY MEDICINE

## 2021-11-23 PROCEDURE — 36415 COLL VENOUS BLD VENIPUNCTURE: CPT | Performed by: EMERGENCY MEDICINE

## 2021-11-23 RX ORDER — KETOROLAC TROMETHAMINE 30 MG/ML
30 INJECTION, SOLUTION INTRAMUSCULAR; INTRAVENOUS ONCE
Status: COMPLETED | OUTPATIENT
Start: 2021-11-23 | End: 2021-11-23

## 2021-11-23 RX ADMIN — KETOROLAC TROMETHAMINE 30 MG: 30 INJECTION, SOLUTION INTRAMUSCULAR at 09:34

## 2021-11-23 NOTE — ED TRIAGE NOTES
Patient presents with concerns for sudden onset of R) middle abdominal pain, some flank pain while at work about 1 hour PTA. She is a . Heidi Shaver RN

## 2021-11-23 NOTE — DISCHARGE INSTRUCTIONS
May try some Tums or Tylenol for discomfort.  Feel free to return at any time if you would like further evaluation or pain increases.

## 2021-11-23 NOTE — ED PROVIDER NOTES
History     Chief Complaint   Patient presents with     Abdominal Pain     HPI  Cecy Wilkerson is a 41 year old female who presents with abdominal pain.  She had sudden onset of sharp right upper quadrant abdominal pain that began 1 hour ago.  No history of similar pain.  She had no vomiting.  She had some diarrhea yesterday.  No recent fever.  She has no history of kidney stones or known gallstones.  Pain is 6 out of 10 and somewhat better now.    Allergies:  Allergies   Allergen Reactions     Oxycodone Other (See Comments)       Problem List:    Patient Active Problem List    Diagnosis Date Noted     Manic affective disorder with recurrent episode (H) 03/15/2018     Priority: Medium     Attention deficit hyperactivity disorder (ADHD), combined type 10/29/2015     Priority: Medium     External hemorrhoids 2015     Priority: Medium     Generalized hyperhidrosis 2015     Priority: Medium     Generalized anxiety disorder 10/09/2014     Priority: Medium     Diagnosis updated by automated process. Provider to review and confirm.       H/O:  2010     Priority: Medium     CARDIOVASCULAR SCREENING; LDL GOAL LESS THAN 160 10/31/2010     Priority: Medium        Past Medical History:    Past Medical History:   Diagnosis Date     Anxiety      Infectious mononucleosis      Presence of intrauterine contraceptive device 11     Varicella without mention of complication        Past Surgical History:    Past Surgical History:   Procedure Laterality Date      SECTION  12/15/2010     SECTION performed by ROSA HYMAN at  [facility]     COLONOSCOPY N/A 2015    Procedure: COMBINED COLONOSCOPY, SINGLE OR MULTIPLE BIOPSY/POLYPECTOMY BY BIOPSY;  Surgeon: Zenon Jay MD;  Location:  GI     ESOPHAGOSCOPY, GASTROSCOPY, DUODENOSCOPY (EGD), COMBINED N/A 2015    Procedure: COMBINED ESOPHAGOSCOPY, GASTROSCOPY, DUODENOSCOPY (EGD), BIOPSY SINGLE OR MULTIPLE;  Surgeon: Pierre  MD Zenon;  Location: [facility]     EXCISE LESN NECK/CHEST,SUBCUTAN  childhood    remove some lumps from the neck as a child     HEMORRHOIDECTOMY EXTERNAL N/A 2015    Procedure: HEMORRHOIDECTOMY EXTERNAL;  Surgeon: Jose Potter MD;  Location:  OR       Family History:    Family History   Problem Relation Age of Onset     Allergies Mother         bees and grass,pollens     Respiratory Mother         asthma     Alcohol/Drug Father         Simone's father     Alcohol/Drug Sister         Simone's sister     Alcohol/Drug Brother         Simone's brother     Allergies Brother         Grass and pollens.     Diabetes Maternal Grandmother         Adult onset     Heart Disease Maternal Grandmother      Hypertension Maternal Grandmother      Lipids Maternal Grandmother      Neurologic Disorder Brother      Respiratory Brother         asthma     Cerebrovascular Disease Paternal Grandmother         CVa age 35     Obesity Maternal Aunt      Obesity Maternal Uncle      Gastrointestinal Disease Other         Crohn's first cousin       Social History:  Marital Status:   [2]  Social History     Tobacco Use     Smoking status: Former Smoker     Packs/day: 1.00     Years: 3.00     Pack years: 3.00     Quit date: 2005     Years since quittin.5     Smokeless tobacco: Never Used   Vaping Use     Vaping Use: Never used   Substance Use Topics     Alcohol use: Yes     Comment: 1 drink every 2 weeks or so     Drug use: No        Medications:    buPROPion (WELLBUTRIN XL) 300 MG 24 hr tablet  diclofenac (VOLTAREN) 75 MG EC tablet  phentermine (ADIPEX-P) 15 MG capsule          Review of Systems  All other systems are reviewed and are negative    Physical Exam   BP: (!) 134/105  Pulse: 62  Temp: 97.4  F (36.3  C)  Resp: 16  Weight: 84.8 kg (187 lb)  SpO2: 99 %      Physical Exam  Vitals and nursing note reviewed.   Constitutional:       General: She is not in acute distress.     Appearance: She is well-developed. She is  not diaphoretic.   HENT:      Head: Normocephalic and atraumatic.   Eyes:      General: No scleral icterus.     Pupils: Pupils are equal, round, and reactive to light.   Cardiovascular:      Rate and Rhythm: Normal rate and regular rhythm.      Heart sounds: Normal heart sounds. No murmur heard.      Pulmonary:      Effort: No respiratory distress.      Breath sounds: No stridor. No wheezing or rales.   Abdominal:      Palpations: Abdomen is soft.      Tenderness: There is abdominal tenderness in the right upper quadrant. There is no guarding or rebound.   Musculoskeletal:         General: No tenderness.      Cervical back: Normal range of motion and neck supple.   Skin:     General: Skin is warm and dry.      Coloration: Skin is not pale.      Findings: No erythema or rash.   Neurological:      Mental Status: She is alert.         ED Course        Procedures              Critical Care time:  none               Results for orders placed or performed during the hospital encounter of 11/23/21 (from the past 24 hour(s))   UA with Microscopic reflex to Culture    Specimen: Urine, Midstream   Result Value Ref Range    Color Urine Yellow Colorless, Straw, Light Yellow, Yellow    Appearance Urine Clear Clear    Glucose Urine Negative Negative mg/dL    Bilirubin Urine Negative Negative    Ketones Urine Negative Negative mg/dL    Specific Gravity Urine 1.004 1.003 - 1.035    Blood Urine Negative Negative    pH Urine 7.0 5.0 - 7.0    Protein Albumin Urine Negative Negative mg/dL    Urobilinogen Urine Normal Normal, 2.0 mg/dL    Nitrite Urine Negative Negative    Leukocyte Esterase Urine Negative Negative    Bacteria Urine Few (A) None Seen /HPF    RBC Urine <1 <=2 /HPF    WBC Urine 1 <=5 /HPF    Squamous Epithelials Urine 3 (H) <=1 /HPF    Narrative    Urine Culture not indicated   CBC with platelets differential    Narrative    The following orders were created for panel order CBC with platelets differential.  Procedure                                Abnormality         Status                     ---------                               -----------         ------                     CBC with platelets and d...[[id number]]                      Final result                 Please view results for these tests on the individual orders.   Comprehensive metabolic panel   Result Value Ref Range    Sodium 140 133 - 144 mmol/L    Potassium 4.1 3.4 - 5.3 mmol/L    Chloride 110 (H) 94 - 109 mmol/L    Carbon Dioxide (CO2) 27 20 - 32 mmol/L    Anion Gap 3 3 - 14 mmol/L    Urea Nitrogen 6 (L) 7 - 30 mg/dL    Creatinine 0.73 0.52 - 1.04 mg/dL    Calcium 8.1 (L) 8.5 - 10.1 mg/dL    Glucose 105 (H) 70 - 99 mg/dL    Alkaline Phosphatase 73 40 - 150 U/L    AST 11 0 - 45 U/L    ALT 19 0 - 50 U/L    Protein Total 7.0 6.8 - 8.8 g/dL    Albumin 3.3 (L) 3.4 - 5.0 g/dL    Bilirubin Total 0.3 0.2 - 1.3 mg/dL    GFR Estimate >90 >60 mL/min/1.73m2   Lipase   Result Value Ref Range    Lipase 64 (L) 73 - 393 U/L   HCG qualitative Blood   Result Value Ref Range    hCG Serum Qualitative Negative Negative   CBC with platelets and differential   Result Value Ref Range    WBC Count 5.5 4.0 - 11.0 10e3/uL    RBC Count 4.26 3.80 - 5.20 10e6/uL    Hemoglobin 12.7 11.7 - 15.7 g/dL    Hematocrit 39.5 35.0 - 47.0 %    MCV 93 78 - 100 fL    MCH 29.8 26.5 - 33.0 pg    MCHC 32.2 31.5 - 36.5 g/dL    RDW 13.8 10.0 - 15.0 %    Platelet Count 260 150 - 450 10e3/uL    % Neutrophils 55 %    % Lymphocytes 36 %    % Monocytes 7 %    % Eosinophils 1 %    % Basophils 1 %    % Immature Granulocytes 0 %    NRBCs per 100 WBC 0 <1 /100    Absolute Neutrophils 3.1 1.6 - 8.3 10e3/uL    Absolute Lymphocytes 2.0 0.8 - 5.3 10e3/uL    Absolute Monocytes 0.4 0.0 - 1.3 10e3/uL    Absolute Eosinophils 0.1 0.0 - 0.7 10e3/uL    Absolute Basophils 0.0 0.0 - 0.2 10e3/uL    Absolute Immature Granulocytes 0.0 <=0.0 10e3/uL    Absolute NRBCs 0.0 10e3/uL   US Abdomen Limited (RUQ)    Narrative    US ABDOMEN  LIMITED 11/23/2021 10:01 AM    CLINICAL HISTORY: RUQ pain.    TECHNIQUE: Limited abdominal ultrasound.    COMPARISON: None.    FINDINGS: Visualization is mildly limited due to overlying bowel gas.    GALLBLADDER: The gallbladder is normal. No gallstones, wall  thickening, or pericholecystic fluid. Negative sonographic Fields's  sign.    BILE DUCTS: There is no biliary dilatation. The common duct measures  5mm.    LIVER: Normal where seen.    RIGHT KIDNEY: No hydronephrosis.    PANCREAS: The visualized portions of the pancreas are normal.    No ascites.      Impression    IMPRESSION:  Normal limited abdominal ultrasound. No evidence for cholelithiasis,  acute cholecystitis, or other etiology for patient's abdominal pain is  identified. The study is mildly limited due to overlying bowel gas.       Medications   ketorolac (TORADOL) injection 30 mg (30 mg Intravenous Given 11/23/21 0934)     10:30 AM mild residual right upper quadrant tenderness.  Offered CT or further evaluation which she declined.  She would like to go home and see how this does.    Assessments & Plan (with Medical Decision Making)  41-year-old female with some nonspecific abdominal pain.  Ultrasound and lab work as above without obvious cause.  CT offered for further evaluation and declined.  Have encouraged her to return if condition worsens or follow-up tomorrow still having pain.  She may try some antacids in the meantime.     I have reviewed the nursing notes.    I have reviewed the findings, diagnosis, plan and need for follow up with the patient.       New Prescriptions    No medications on file       Final diagnoses:   None       11/23/2021   [facility] EMERGENCY DEPT     Eric Loaiza MD  11/23/21 1032

## 2022-01-11 ENCOUNTER — TELEPHONE (OUTPATIENT)
Dept: RHEUMATOLOGY | Facility: CLINIC | Age: 42
End: 2022-01-11

## 2022-01-11 ENCOUNTER — VIRTUAL VISIT (OUTPATIENT)
Dept: RHEUMATOLOGY | Facility: CLINIC | Age: 42
End: 2022-01-11
Payer: COMMERCIAL

## 2022-01-11 DIAGNOSIS — M54.50 CHRONIC BILATERAL LOW BACK PAIN WITHOUT SCIATICA: ICD-10-CM

## 2022-01-11 DIAGNOSIS — R76.8 POSITIVE ANA (ANTINUCLEAR ANTIBODY): Primary | ICD-10-CM

## 2022-01-11 DIAGNOSIS — G47.8 NON-RESTORATIVE SLEEP: ICD-10-CM

## 2022-01-11 DIAGNOSIS — M25.50 CHRONIC PAIN OF MULTIPLE JOINTS: Primary | ICD-10-CM

## 2022-01-11 DIAGNOSIS — R76.8 POSITIVE ANA (ANTINUCLEAR ANTIBODY): ICD-10-CM

## 2022-01-11 DIAGNOSIS — G89.29 CHRONIC BILATERAL LOW BACK PAIN WITHOUT SCIATICA: ICD-10-CM

## 2022-01-11 DIAGNOSIS — G89.29 CHRONIC PAIN OF MULTIPLE JOINTS: Primary | ICD-10-CM

## 2022-01-11 DIAGNOSIS — M79.10 MYALGIA: ICD-10-CM

## 2022-01-11 PROCEDURE — 99205 OFFICE O/P NEW HI 60 MIN: CPT | Mod: 95 | Performed by: INTERNAL MEDICINE

## 2022-01-11 RX ORDER — CYCLOBENZAPRINE HCL 10 MG
TABLET ORAL
Qty: 30 TABLET | Refills: 2 | Status: SHIPPED | OUTPATIENT
Start: 2022-01-11 | End: 2022-03-04

## 2022-01-11 RX ORDER — CYCLOBENZAPRINE HCL 10 MG
TABLET ORAL
Qty: 30 TABLET | Refills: 2 | Status: SHIPPED | OUTPATIENT
Start: 2022-01-11 | End: 2022-01-11

## 2022-01-11 NOTE — PROGRESS NOTES
Cecy Wilkerson who presents today with a chief complaint of  No chief complaint on file.      Joint Pains: Yes  Location: knees and elbows  Onset: 1.5 year  Intensity: 5 /10  AM Stiffness: 25 Minutes  Alleviating/Aggravating Factors: walking helps. Standing and diet affect pain. Medications helpful?  Tolerating Meds: Yes  Other:         ROS:  Patient denies having: +persistent dry eyes, +dry mouth, recurrent oral ulcers, +patchy alopecia (hair thinning), active rashes, +photosensitivity, history of psoriasis, active chest pain, active shortness of breath, active cough, active dysuria, history of kidney stones, active abdominal pain, active diarrhea, history of hematochezia, active dysphagia, history of peptic ulcer disease, history of HIV, tuberculosis, hepatitis B or C, Lyme disease, seizure history, raynaud's, active documented fevers, recent infections, +difficulty sleeping or chronic unrefreshing sleep, involuntary weight loss, loss of appetite, +excessive fatigue, depression, anxiety, + recurrent sinus infections, history of inflammatory eye diseases (such as uveitis, scleritis, iritis, etc).       Information gathered by medical assistant incorporated into this note, was reviewed and discussed with the patient.    Problem List:  Patient Active Problem List   Diagnosis     CARDIOVASCULAR SCREENING; LDL GOAL LESS THAN 160     H/O:      Generalized anxiety disorder     Generalized hyperhidrosis     External hemorrhoids     Attention deficit hyperactivity disorder (ADHD), combined type     Manic affective disorder with recurrent episode (H)        PMH:   Past Medical History:   Diagnosis Date     Anxiety      Infectious mononucleosis      Presence of intrauterine contraceptive device 11    Mirena placed 11/ removed 13     Varicella without mention of complication     Had twice as a child.       Surgical History:  Past Surgical History:   Procedure Laterality Date      SECTION   12/15/2010     SECTION performed by ROSA HYMAN at  [facility]     COLONOSCOPY N/A 2015    Procedure: COMBINED COLONOSCOPY, SINGLE OR MULTIPLE BIOPSY/POLYPECTOMY BY BIOPSY;  Surgeon: Zenon Jay MD;  Location:  GI     ESOPHAGOSCOPY, GASTROSCOPY, DUODENOSCOPY (EGD), COMBINED N/A 2015    Procedure: COMBINED ESOPHAGOSCOPY, GASTROSCOPY, DUODENOSCOPY (EGD), BIOPSY SINGLE OR MULTIPLE;  Surgeon: Zenon Jay MD;  Location:  GI     EXCISE LESN NECK/CHEST,SUBCUTAN  childhood    remove some lumps from the neck as a child     HEMORRHOIDECTOMY EXTERNAL N/A 2015    Procedure: HEMORRHOIDECTOMY EXTERNAL;  Surgeon: Jose Potter MD;  Location:  OR       Family History:  Family History   Problem Relation Age of Onset     Allergies Mother         bees and grass,pollens     Respiratory Mother         asthma     Alcohol/Drug Father         Simone's father     Alcohol/Drug Sister         Simone's sister     Alcohol/Drug Brother         Simone's brother     Allergies Brother         Grass and pollens.     Diabetes Maternal Grandmother         Adult onset     Heart Disease Maternal Grandmother      Hypertension Maternal Grandmother      Lipids Maternal Grandmother      Neurologic Disorder Brother      Respiratory Brother         asthma     Cerebrovascular Disease Paternal Grandmother         CVa age 35     Obesity Maternal Aunt      Obesity Maternal Uncle      Gastrointestinal Disease Other         Crohn's first cousin       Social History:   reports that she quit smoking about 16 years ago. She has a 3.00 pack-year smoking history. She has never used smokeless tobacco. She reports current alcohol use. She reports that she does not use drugs.    Allergies:  Allergies   Allergen Reactions     Oxycodone Other (See Comments)        Current Medications:  Current Outpatient Medications   Medication Sig Dispense Refill     buPROPion (WELLBUTRIN XL) 300 MG 24 hr tablet Take 1 tablet (300 mg) by mouth  every morning 90 tablet 1     diclofenac (VOLTAREN) 75 MG EC tablet Take 1 tablet (75 mg) by mouth 2 times daily 60 tablet 1     phentermine (ADIPEX-P) 15 MG capsule Take 1 capsule (15 mg) by mouth every morning 30 capsule 2           Physical Exam:  Following up today via video visit, per Covid-19 pandemic requirements.    Verbal consent has been obtained for this service by care team member.    Video call start time: 1:13 PM    Video call end time: 1:37 PM    Shereet utilized for video call.]    Patient location for video visit: Home     Provider location for video visit:  Home (working remotely)        Summary/Assessment:    Pleasant 41-year-old female with pertinent past medical history of positive LIUDMILA presents with chronic arthralgias/myalgias and distal lower extremity swelling.    Patient explains about a year and a half ago began experiencing joint pains involving knees, elbows and feet.    Has been noticing some swelling involving distal lower extremities/ankles and whole feet.  Particularly noticeable towards the end of the day.  States had similar presentation during pregnancy.    Adds has gained about 55 pounds during this timeframe and only lately has lost about 25 pounds with limiting diet and phentermine.    Unremarkable urinalysis.    Has been utilizing compression socks at times, self purchased.    Has been seeing foot specialist, managing plantar fasciitis with orthotics and boot at night.    Denies having joint swelling involving knees and elbows.    Over the past 6 months has been experiencing some low back pain, nonradiating.  Denies history of low back trauma.    Admits to having myalgias, chronic nonrestorative sleep and chronic fatigue.    For arthralgias/myalgias takes ibuprofen 400 mg about once a day.  Has not tried taking Tylenol.  .  States is 5 foot 4 and about 180 pounds.    Given the above, difficult to tell with certainty at this time as to what the primary source is contributing to  "symptoms described.  Given positive LIUDMILA x2 may have evolving connective tissue disease however ANAs are nonspecific and there are often false positives.  May have a component of myofascial pains contributing to arthralgias/myalgias given chronic nonrestorative sleep/chronic fatigue however this is a diagnosis of exclusion sometimes may just be a secondary/superimposed condition.    Please see below for management plan.    Pertinent rheumatology/past medical history (please refer to above for more detailed history):      Positive LIUDMILA x2, 4 months apart (1-320 and 1-160, both speckled patterns)    \"Distal lower extremity swelling, bilateral\" with weight gain (improving lately)    Chronic multiple joint pains (elbows, knees, feet)    Myalgias    Chronic nonrestorative sleep/chronic fatigue    Plantar fasciitis    Chronic low back pain, bilateral, nonradiating    Photosensitive rashes (2021, arms and chest)    Family history for autoimmunity (grandmother with lupus, mother with MS and cousin with Crohn's).      Rheumatology medications provided/suggested:    Tylenol  Flexeril      Pertinent medication from other providers or from otc (please refer to above for more detailed med list):    Ibuprofen  Phentermine (started after edema/weight gain)      Pertinent medications already tried:           Pertinent lab history:    Positive/elevated: LIUDMILA    Negative/unremarkable: CBC, urinalysis, TSH    Pertinent imaging/test history:      Xrays Feet: Bilateral posterior calcaneal spurring. Otherwise  unremarkable. No fracture identified.       Other:    Marital status:       How many kids:  2    Type of work:  Yes,  and school lunch lady     Drinking alcohol: once a month    Tobacco use: no     Recreational drug use: no     Active contraceptive: no    History hysterectomy: no     Tubal ligation: no         Plan:      We will add Flexeril to act as a muscle relaxant hopefully improve her sleep.  Hold " Flexeril if trying to conceive or becomes pregnant.    For arthralgias/myalgias, suggest paient take over-the-counter Tylenol 500-1000 mg twice daily as needed for pain relief.    Recommended reserving ibuprofen for pain not responsive to Tylenol and to limiting dose as sometimes can contribute to peripheral edema.    Given positive LIUDMILA we will continue to monitor for any signs and symptoms consistent with having an active connective tissue disease and manage accordingly.    We will obtain some labs and correlate clinically.    Follow-up in about 2 months.      Procedure note:     Total time spent 60  minutes involved with patient care, includes placing orders, reviewing records and formulating management plan.      Major side effect profile of medications provided/suggested were discussed with the patient.    This note was transcribed using Dragon voice recognition software as a result unintentional grammatical errors or word substitutions may have occurred. Please contact our Rheumatology department if you need any clarification or if you have any related inquiries.    Thank you for referring this patient to our clinic.      Javy Greer DO  ...................  1/11/2022   12:43 PM

## 2022-01-11 NOTE — TELEPHONE ENCOUNTER
Attempted to call patient to discuss below. Unable to LVM as mailbox was full.    Wanderfly message sent.

## 2022-01-11 NOTE — PATIENT INSTRUCTIONS
Summary of Your Rheumatology Visit    Next Appointment:   About 2 months    Medications:    Please Flexeril, please follow directives on pill bottle on how to take medication(s) provided.  Hold Flexeril if trying to conceive or if becomes pregnant.    Recommend trying Tylenol 500-1000 mg twice a day if necessary for pain relief.      Referrals:      Tests:     Please have labs that were ordered performed.    Injections:      Other:

## 2022-01-11 NOTE — TELEPHONE ENCOUNTER
Please obtain verbal authorization to check HLA-B27 level, thereafter please add order.    Please inquire if also has buttock or upper buttock pain?  If yes, please order x-rays of SI joints.     Please inquire if has morning stiffness involving low back?  If yes for how long.    Please also confirm with patient that she is not pregnant.  Avoid ibuprofen and Flexeril during pregnancy unless authorization provided by her OB/GYN.

## 2022-01-11 NOTE — LETTER
January 18, 2022      Cecy Wilkerson  [address] [place] [phone]        Dear Cecy,     We have ordered the Genetic testing lab, in order to have this lab drawn the consent attach will need to be signed and brought in with you to your lab appointment.   If you have any questions please reach out to our clinic at [phone].      Sincerely,        Javy Greer, DO

## 2022-02-01 ENCOUNTER — TELEPHONE (OUTPATIENT)
Dept: RHEUMATOLOGY | Facility: CLINIC | Age: 42
End: 2022-02-01

## 2022-02-01 NOTE — TELEPHONE ENCOUNTER
Lvmtcb. Please give pt a call to schedule. Thank you         Next Appointment:   About 2 months          Tests:      Please have labs that were ordered performed.

## 2022-02-12 ENCOUNTER — HEALTH MAINTENANCE LETTER (OUTPATIENT)
Age: 42
End: 2022-02-12

## 2022-02-14 DIAGNOSIS — M72.2 PLANTAR FASCIITIS: ICD-10-CM

## 2022-02-14 RX ORDER — DICLOFENAC SODIUM 75 MG/1
75 TABLET, DELAYED RELEASE ORAL 2 TIMES DAILY
Qty: 60 TABLET | Refills: 1 | Status: CANCELLED | OUTPATIENT
Start: 2022-02-14

## 2022-02-14 NOTE — TELEPHONE ENCOUNTER
RX denied, see prollie message to patient.     Myrna CANDELARIO, Lead RN, BSN. . .  2/14/2022, 9:44 AM

## 2022-02-14 NOTE — TELEPHONE ENCOUNTER
Chief Complaint   Patient presents with     Refill Request     diclofenac      Refill request received via fax by pharmacy      [id number] - [address]      Pending Prescriptions:                       Disp   Refills    diclofenac (VOLTAREN) 75 MG EC tablet     60 tab*1            Sig: Take 1 tablet (75 mg) by mouth 2 times daily         Last Written Prescription Date:  11/8/21, end date of 1/11/22, not on current med list  Last Fill Quantity: 60,   # refills: 1  Last Office Visit with prescribing provider: 11/8/21  Future Office visit: none

## 2022-03-03 ENCOUNTER — MYC MEDICAL ADVICE (OUTPATIENT)
Dept: FAMILY MEDICINE | Facility: CLINIC | Age: 42
End: 2022-03-03
Payer: COMMERCIAL

## 2022-03-04 ENCOUNTER — TELEPHONE (OUTPATIENT)
Dept: FAMILY MEDICINE | Facility: CLINIC | Age: 42
End: 2022-03-04

## 2022-03-04 ENCOUNTER — VIRTUAL VISIT (OUTPATIENT)
Dept: FAMILY MEDICINE | Facility: CLINIC | Age: 42
End: 2022-03-04
Payer: COMMERCIAL

## 2022-03-04 DIAGNOSIS — F39 MOOD DISORDER (H): ICD-10-CM

## 2022-03-04 DIAGNOSIS — R63.5 WEIGHT GAIN: ICD-10-CM

## 2022-03-04 PROCEDURE — 99214 OFFICE O/P EST MOD 30 MIN: CPT | Mod: 95 | Performed by: PHYSICIAN ASSISTANT

## 2022-03-04 RX ORDER — BUPROPION HYDROCHLORIDE 300 MG/1
300 TABLET ORAL EVERY MORNING
Qty: 90 TABLET | Refills: 1 | Status: SHIPPED | OUTPATIENT
Start: 2022-03-04 | End: 2022-10-03

## 2022-03-04 RX ORDER — PHENTERMINE HYDROCHLORIDE 30 MG/1
30 CAPSULE ORAL EVERY MORNING
Qty: 30 CAPSULE | Refills: 2 | Status: SHIPPED | OUTPATIENT
Start: 2022-03-04 | End: 2022-05-31

## 2022-03-04 NOTE — PROGRESS NOTES
marlys is a 42 year old who is being evaluated via a billable telephone visit.      What phone number would you like to be contacted at? [phone]  How would you like to obtain your AVS? Shereemelissa frankel is a 42 year old who presents for the following health issues     History of Present Illness     Reason for visit:  Refill  Symptom onset:  More than a month  Symptoms include:  Refills  Symptom intensity:  Mild  Symptom progression:  Staying the same  Had these symptoms before:  No    She eats 0-1 servings of fruits and vegetables daily.She consumes 1 sweetened beverage(s) daily.She exercises with enough effort to increase her heart rate 10 to 19 minutes per day.  She exercises with enough effort to increase her heart rate 3 or less days per week. She is missing 1 dose(s) of medications per week.  She is not taking prescribed medications regularly due to remembering to take.       Medication Followup of Phentermine    Taking Medication as prescribed: yes    Side Effects:  None    Medication Helping Symptoms:  Yes, want to talk about increasing the dosage. She is no longer losing any weight.     Down to 180 but then hit a stand still. Has not been gaining any which is good but would like to continue to work on losing some. Continues to work on diet and exercise. Tolerating this well without any side effects.     Moods have been good but just lost grandfather and brother recently. She does feel things have been stable otherwise and that the wellbutrin has been a good help. Would like to continue same dose.      Review of Systems   Constitutional, HEENT, cardiovascular, pulmonary, GI, , musculoskeletal, neuro, skin, endocrine and psych systems are negative, except as otherwise noted.      Objective           Vitals:  No vitals were obtained today due to virtual visit.    Physical Exam   healthy, alert and no distress  PSYCH: Alert and oriented times 3; coherent speech, normal   rate and volume, able to  articulate logical thoughts, able   to abstract reason, no tangential thoughts, no hallucinations   or delusions  Her affect is normal and pleasant  RESP: No cough, no audible wheezing, able to talk in full sentences  Remainder of exam unable to be completed due to telephone visits      Assessment & Plan     Mood disorder (H)  Moods doing well on current dose of Wellbutrin. Will continue this.   - buPROPion (WELLBUTRIN XL) 300 MG 24 hr tablet; Take 1 tablet (300 mg) by mouth every morning    Weight gain  Hit a plateau. Will increase to 30 mg at this time. Encouraged ongoing diet and exercise modifications.   - buPROPion (WELLBUTRIN XL) 300 MG 24 hr tablet; Take 1 tablet (300 mg) by mouth every morning  - phentermine (ADIPEX-P) 30 MG capsule; Take 1 capsule (30 mg) by mouth every morning    The patient indicates understanding of these issues and agrees with the plan.    Kyleigh Nichols PA-C  [facility]    Phone call duration: 5 minutes

## 2022-03-04 NOTE — TELEPHONE ENCOUNTER
Central Prior Authorization Team   Phone: [phone]      PRIOR AUTHORIZATION DENIED    Medication: phentermine (ADIPEX-P) 30 MG capsule - EPA DENIED    Denial Date: 3/4/2022    Denial Rational:        Appeal Information: If the provider would like to appeal this denial, please provide a letter of medical necessity. Please also include any therapies that the patient has tried and their outcomes. The patient's insurance company will also require the provider to address why the insurance preferred options are not appropriate in the patient's therapy.  The reason could be that the preferred options will harm the patient; either physically or mentally. They are contraindicated to the patient; or the patient has already been taking the requested medication and changing the therapy would change the outcome of their therapy.    Once it has been completed and placed in the patient's chart, notify the Central PA Team ([facility]) and the appeal can be initiated on behalf of the patient and provider.

## 2022-03-29 DIAGNOSIS — M72.2 PLANTAR FASCIITIS: ICD-10-CM

## 2022-03-29 NOTE — TELEPHONE ENCOUNTER
Requested Prescriptions   Pending Prescriptions Disp Refills     diclofenac (VOLTAREN) 75 MG EC tablet 60 tablet 1     Sig: Take 1 tablet (75 mg) by mouth 2 times daily       There is no refill protocol information for this order        Last Written Prescription Date:  11/8/2021  Last Fill Quantity: 60,  # refills: 1   Last office visit: 11/8/2021 with prescribing provider:     Future Office Visit:

## 2022-03-29 NOTE — TELEPHONE ENCOUNTER
"Future Office Visit:      Requested Prescriptions   Pending Prescriptions Disp Refills     diclofenac (VOLTAREN) 75 MG EC tablet 60 tablet 1     Sig: Take 1 tablet (75 mg) by mouth 2 times daily       NSAID Medications Failed - 3/29/2022 11:08 AM        Failed - Medication is active on med list        Passed - Blood pressure under 140/90 in past 12 months     BP Readings from Last 3 Encounters:   11/23/21 120/81   11/08/21 110/76   10/11/21 126/72                 Passed - Normal ALT on file in past 12 months     Recent Labs   Lab Test 11/23/21  0931   ALT 19             Passed - Normal AST on file in past 12 months     Recent Labs   Lab Test 11/23/21  0931   AST 11             Passed - Recent (12 mo) or future (30 days) visit within the authorizing provider's specialty     Patient has had an office visit with the authorizing provider or a provider within the authorizing providers department within the previous 12 mos or has a future within next 30 days. See \"Patient Info\" tab in inbasket, or \"Choose Columns\" in Meds & Orders section of the refill encounter.              Passed - Patient is age 6-64 years        Passed - Normal CBC on file in past 12 months     Recent Labs   Lab Test 11/23/21  0931   WBC 5.5   RBC 4.26   HGB 12.7   HCT 39.5                    Passed - No active pregnancy on record        Passed - Normal serum creatinine on file in past 12 months     Recent Labs   Lab Test 11/23/21  0931   CR 0.73       Ok to refill medication if creatinine is low          Passed - No positive pregnancy test in past 12 months             Unable to fill per RN protocol, will forward to provider.     Yusra Hernandez RN on 3/29/2022 at 12:35 PM    "

## 2022-03-31 RX ORDER — DICLOFENAC SODIUM 75 MG/1
75 TABLET, DELAYED RELEASE ORAL 2 TIMES DAILY
Qty: 60 TABLET | Refills: 1 | Status: SHIPPED | OUTPATIENT
Start: 2022-03-31 | End: 2022-10-03

## 2022-05-28 DIAGNOSIS — R63.5 WEIGHT GAIN: ICD-10-CM

## 2022-05-31 RX ORDER — PHENTERMINE HYDROCHLORIDE 30 MG/1
30 CAPSULE ORAL EVERY MORNING
Qty: 30 CAPSULE | Refills: 2 | Status: SHIPPED | OUTPATIENT
Start: 2022-05-31 | End: 2022-10-03

## 2022-05-31 NOTE — TELEPHONE ENCOUNTER
Routing refill request to provider for review/approval because:  Drug not on the [facility] refill protocol     JOSHUA Medina, RN  [facility]

## 2022-06-06 ENCOUNTER — LAB (OUTPATIENT)
Dept: LAB | Facility: CLINIC | Age: 42
End: 2022-06-06
Payer: COMMERCIAL

## 2022-06-06 DIAGNOSIS — R76.8 POSITIVE ANA (ANTINUCLEAR ANTIBODY): ICD-10-CM

## 2022-06-06 LAB
B BURGDOR IGG+IGM SER QL: 0.15
CREAT UR-MCNC: 48 MG/DL
CRP SERPL-MCNC: 10.2 MG/L (ref 0–8)
ERYTHROCYTE [SEDIMENTATION RATE] IN BLOOD BY WESTERGREN METHOD: 10 MM/HR (ref 0–20)
MICROALBUMIN UR-MCNC: <5 MG/L
MICROALBUMIN/CREAT UR: NORMAL MG/G{CREAT}
URATE SERPL-MCNC: 4.7 MG/DL (ref 2.6–6)

## 2022-06-06 PROCEDURE — 86160 COMPLEMENT ANTIGEN: CPT | Mod: 59

## 2022-06-06 PROCEDURE — 86225 DNA ANTIBODY NATIVE: CPT

## 2022-06-06 PROCEDURE — 86160 COMPLEMENT ANTIGEN: CPT

## 2022-06-06 PROCEDURE — 85652 RBC SED RATE AUTOMATED: CPT

## 2022-06-06 PROCEDURE — 84550 ASSAY OF BLOOD/URIC ACID: CPT

## 2022-06-06 PROCEDURE — 82043 UR ALBUMIN QUANTITATIVE: CPT

## 2022-06-06 PROCEDURE — 86618 LYME DISEASE ANTIBODY: CPT

## 2022-06-06 PROCEDURE — 36415 COLL VENOUS BLD VENIPUNCTURE: CPT

## 2022-06-06 PROCEDURE — 86147 CARDIOLIPIN ANTIBODY EA IG: CPT | Mod: 59

## 2022-06-06 PROCEDURE — 86235 NUCLEAR ANTIGEN ANTIBODY: CPT | Mod: 59

## 2022-06-06 PROCEDURE — 85390 FIBRINOLYSINS SCREEN I&R: CPT | Performed by: PATHOLOGY

## 2022-06-06 PROCEDURE — 86256 FLUORESCENT ANTIBODY TITER: CPT

## 2022-06-06 PROCEDURE — 99000 SPECIMEN HANDLING OFFICE-LAB: CPT

## 2022-06-06 PROCEDURE — 86147 CARDIOLIPIN ANTIBODY EA IG: CPT

## 2022-06-06 PROCEDURE — 85730 THROMBOPLASTIN TIME PARTIAL: CPT

## 2022-06-06 PROCEDURE — 86036 ANCA SCREEN EACH ANTIBODY: CPT

## 2022-06-06 PROCEDURE — 81374 HLA I TYPING 1 ANTIGEN LR: CPT

## 2022-06-06 PROCEDURE — 86140 C-REACTIVE PROTEIN: CPT

## 2022-06-06 PROCEDURE — 85613 RUSSELL VIPER VENOM DILUTED: CPT

## 2022-06-06 PROCEDURE — 82164 ANGIOTENSIN I ENZYME TEST: CPT | Mod: 90

## 2022-06-07 LAB
ACE SERPL-CCNC: 25 U/L
ANCA AB PATTERN SER IF-IMP: NORMAL
C-ANCA TITR SER IF: NORMAL {TITER}
C3 SERPL-MCNC: 132 MG/DL (ref 81–157)
C4 SERPL-MCNC: 25 MG/DL (ref 13–39)
CARDIOLIPIN IGA SER IA-ACNC: 2.2 APL-U/ML
CARDIOLIPIN IGA SER IA-ACNC: NEGATIVE
CARDIOLIPIN IGG SER IA-ACNC: <2 GPL-U/ML
CARDIOLIPIN IGG SER IA-ACNC: NEGATIVE
CARDIOLIPIN IGM SER IA-ACNC: 3.8 MPL-U/ML
CARDIOLIPIN IGM SER IA-ACNC: NEGATIVE
DRVVT SCREEN RATIO: 0.79
DSDNA AB SER-ACNC: 0.9 IU/ML
ENA SM IGG SER IA-ACNC: 3.1 U/ML
ENA SM IGG SER IA-ACNC: NEGATIVE
ENA SS-A AB SER IA-ACNC: <0.5 U/ML
ENA SS-A AB SER IA-ACNC: NEGATIVE
ENA SS-B IGG SER IA-ACNC: <0.6 U/ML
ENA SS-B IGG SER IA-ACNC: NEGATIVE
INR PPP: 0.96 (ref 0.85–1.15)
LA PPP-IMP: NEGATIVE
LUPUS INTERPRETATION: NORMAL
PTT RATIO: 1.11
THROMBIN TIME: 15.9 SECONDS (ref 13–19)
U1 SNRNP IGG SER IA-ACNC: <1.1 U/ML
U1 SNRNP IGG SER IA-ACNC: NEGATIVE

## 2022-06-08 LAB
B LOCUS: NORMAL
B27TEST METHOD: NORMAL

## 2022-10-03 ENCOUNTER — OFFICE VISIT (OUTPATIENT)
Dept: FAMILY MEDICINE | Facility: CLINIC | Age: 42
End: 2022-10-03
Payer: COMMERCIAL

## 2022-10-03 VITALS
TEMPERATURE: 97.8 F | HEART RATE: 91 BPM | WEIGHT: 198 LBS | BODY MASS INDEX: 32.99 KG/M2 | HEIGHT: 65 IN | OXYGEN SATURATION: 98 % | DIASTOLIC BLOOD PRESSURE: 74 MMHG | SYSTOLIC BLOOD PRESSURE: 122 MMHG

## 2022-10-03 DIAGNOSIS — F39 MOOD DISORDER (H): ICD-10-CM

## 2022-10-03 DIAGNOSIS — R63.5 WEIGHT GAIN: ICD-10-CM

## 2022-10-03 PROCEDURE — 99213 OFFICE O/P EST LOW 20 MIN: CPT | Performed by: PHYSICIAN ASSISTANT

## 2022-10-03 RX ORDER — METFORMIN HCL 500 MG
500 TABLET, EXTENDED RELEASE 24 HR ORAL
Qty: 90 TABLET | Refills: 1 | Status: SHIPPED | OUTPATIENT
Start: 2022-10-03 | End: 2022-10-25

## 2022-10-03 RX ORDER — BUPROPION HYDROCHLORIDE 300 MG/1
300 TABLET ORAL EVERY MORNING
Qty: 90 TABLET | Refills: 3 | Status: SHIPPED | OUTPATIENT
Start: 2022-10-03 | End: 2023-06-16

## 2022-10-03 ASSESSMENT — PAIN SCALES - GENERAL: PAINLEVEL: NO PAIN (0)

## 2022-10-03 NOTE — PROGRESS NOTES
Nisa frankel is a 42 year old, presenting for the following health issues:  Recheck Medication      History of Present Illness       Reason for visit:  Re-fill prescription possible switch, discuss possibly starting phentermine again or something else    She eats 0-1 servings of fruits and vegetables daily.She consumes 0 sweetened beverage(s) daily.She exercises with enough effort to increase her heart rate 10 to 19 minutes per day.  She exercises with enough effort to increase her heart rate 3 or less days per week. She is missing 3 dose(s) of medications per week.  She is not taking prescribed medications regularly due to remembering to take.     Moods overall doing really well. Feels well balanced on current dose of Wellbutrin. Tolerating this well without any side effects.     Frustrated a bit by her weight. Has been eating well and continuing to exercise. She was on Phentermine but as soon as she stops the weight increases again.     Review of Systems   Constitutional, HEENT, cardiovascular, pulmonary, GI, , musculoskeletal, neuro, skin, endocrine and psych systems are negative, except as otherwise noted.      Objective    Pulse 91   Temp 97.8  F (36.6  C) (Temporal)   Wt 89.8 kg (198 lb)   SpO2 98%   BMI 32.70 kg/m    Body mass index is 32.7 kg/m .  Physical Exam   GENERAL: healthy, alert and no distress  RESP: lungs clear to auscultation - no rales, rhonchi or wheezes  CV: regular rate and rhythm, normal S1 S2, no S3 or S4, no murmur, click or rub, no peripheral edema and peripheral pulses strong  SKIN: no suspicious lesions or rashes  PSYCH: mentation appears normal, affect normal/bright        Assessment & Plan     Mood disorder (H)  Moods continue to be well controlled with current dose of Wellbutrin.   - buPROPion (WELLBUTRIN XL) 300 MG 24 hr tablet; Take 1 tablet (300 mg) by mouth every morning    Weight gain  Weight continues to be bothersome. Will have patient trial Metformin at this  "time. Appropriate use and side effects discussed. Encouraged ongoing diet and exercise modifications.   - buPROPion (WELLBUTRIN XL) 300 MG 24 hr tablet; Take 1 tablet (300 mg) by mouth every morning  - metFORMIN (GLUCOPHAGE XR) 500 MG 24 hr tablet; Take 1 tablet (500 mg) by mouth daily (with dinner)    BMI:   Estimated body mass index is 32.7 kg/m  as calculated from the following:    Height as of this encounter: 1.657 m (5' 5.25\").    Weight as of this encounter: 89.8 kg (198 lb).   Weight management plan: Discussed healthy diet and exercise guidelines    The patient indicates understanding of these issues and agrees with the plan.    THALIA [facility]      "

## 2022-10-09 ENCOUNTER — HEALTH MAINTENANCE LETTER (OUTPATIENT)
Age: 42
End: 2022-10-09

## 2023-02-08 ENCOUNTER — MYC MEDICAL ADVICE (OUTPATIENT)
Dept: FAMILY MEDICINE | Facility: CLINIC | Age: 43
End: 2023-02-08
Payer: COMMERCIAL

## 2023-02-18 ENCOUNTER — HEALTH MAINTENANCE LETTER (OUTPATIENT)
Age: 43
End: 2023-02-18

## 2023-04-21 DIAGNOSIS — R63.5 WEIGHT GAIN: ICD-10-CM

## 2023-04-21 NOTE — TELEPHONE ENCOUNTER
Pending Prescriptions:                       Disp   Refills    metFORMIN (GLUCOPHAGE XR) 500 MG 24 hr tab*180 ta*1        Sig: Take 2 tablets (1,000 mg) by mouth daily (with           dinner)      Routing refill request to provider for review/approval because:  Labs not current:  a1c, creatinine    Karissa Keita RN on 4/21/2023 at 2:17 PM

## 2023-04-24 RX ORDER — METFORMIN HCL 500 MG
1000 TABLET, EXTENDED RELEASE 24 HR ORAL
Qty: 180 TABLET | Refills: 1 | Status: SHIPPED | OUTPATIENT
Start: 2023-04-24 | End: 2023-06-16

## 2023-06-16 ENCOUNTER — OFFICE VISIT (OUTPATIENT)
Dept: FAMILY MEDICINE | Facility: CLINIC | Age: 43
End: 2023-06-16
Payer: COMMERCIAL

## 2023-06-16 VITALS
OXYGEN SATURATION: 100 % | HEIGHT: 64 IN | DIASTOLIC BLOOD PRESSURE: 70 MMHG | WEIGHT: 187.5 LBS | TEMPERATURE: 98 F | SYSTOLIC BLOOD PRESSURE: 110 MMHG | HEART RATE: 76 BPM | RESPIRATION RATE: 20 BRPM | BODY MASS INDEX: 32.01 KG/M2

## 2023-06-16 DIAGNOSIS — F39 MOOD DISORDER (H): ICD-10-CM

## 2023-06-16 DIAGNOSIS — E66.811 CLASS 1 OBESITY WITHOUT SERIOUS COMORBIDITY WITH BODY MASS INDEX (BMI) OF 32.0 TO 32.9 IN ADULT, UNSPECIFIED OBESITY TYPE: Primary | ICD-10-CM

## 2023-06-16 PROCEDURE — 99213 OFFICE O/P EST LOW 20 MIN: CPT | Performed by: PHYSICIAN ASSISTANT

## 2023-06-16 RX ORDER — TOPIRAMATE 100 MG/1
100 TABLET, FILM COATED ORAL 2 TIMES DAILY
Qty: 180 TABLET | Refills: 1 | Status: SHIPPED | OUTPATIENT
Start: 2023-06-16 | End: 2024-02-23

## 2023-06-16 RX ORDER — BUPROPION HYDROCHLORIDE 300 MG/1
300 TABLET ORAL EVERY MORNING
Qty: 90 TABLET | Refills: 3 | Status: SHIPPED | OUTPATIENT
Start: 2023-06-16 | End: 2024-02-23

## 2023-06-16 RX ORDER — METFORMIN HCL 500 MG
1000 TABLET, EXTENDED RELEASE 24 HR ORAL
Qty: 180 TABLET | Refills: 1 | Status: SHIPPED | OUTPATIENT
Start: 2023-06-16 | End: 2024-02-16

## 2023-06-16 ASSESSMENT — PAIN SCALES - GENERAL: PAINLEVEL: NO PAIN (0)

## 2023-06-16 NOTE — PROGRESS NOTES
"  Nisa frankel is a 43 year old, presenting for the following health issues:  Recheck Medication        6/16/2023     6:47 AM   Additional Questions   Roomed by Maribel PATEL     History of Present Illness       Reason for visit:  Medication    She eats 0-1 servings of fruits and vegetables daily.        Medication Followup of Metformin    Taking Medication as prescribed: yes    Side Effects:  Diarrhea    Medication Helping Symptoms:  Yes    Patient presents today for follow-up regarding weight management. She reports she stopped medications this winter and feels she really fell off the horse. Suspects she was up over 200 lbs. Restarted her medication. This has been going well. Does have some loose stools with the Metformin but this is tolerated. Moods doing well.      Review of Systems   Constitutional, HEENT, cardiovascular, pulmonary, GI, , musculoskeletal, neuro, skin, endocrine and psych systems are negative, except as otherwise noted.      Objective    /70   Pulse 76   Temp 98  F (36.7  C) (Temporal)   Resp 20   Ht 1.626 m (5' 4\")   Wt 85 kg (187 lb 8 oz)   SpO2 100%   BMI 32.18 kg/m    Body mass index is 32.18 kg/m .  Physical Exam   GENERAL: healthy, alert and no distress  HENT: ear canals and TM's normal, nose and mouth without ulcers or lesions  NECK: no adenopathy, no asymmetry, masses, or scars and thyroid normal to palpation  RESP: lungs clear to auscultation - no rales, rhonchi or wheezes  CV: regular rate and rhythm, normal S1 S2, no S3 or S4, no murmur, click or rub, no peripheral edema and peripheral pulses strong  SKIN: no suspicious lesions or rashes  PSYCH: mentation appears normal, affect normal/bright    Assessment & Plan     Class 1 obesity without serious comorbidity with body mass index (BMI) of 32.0 to 32.9 in adult, unspecified obesity type  Doing well since restarting medications. Patient will continue to work on diet and exercise modifications with this as well. Follow-up " in 6 months, sooner if needed.   - buPROPion (WELLBUTRIN XL) 300 MG 24 hr tablet; Take 1 tablet (300 mg) by mouth every morning  - metFORMIN (GLUCOPHAGE XR) 500 MG 24 hr tablet; Take 2 tablets (1,000 mg) by mouth daily (with dinner)  - topiramate (TOPAMAX) 100 MG tablet; Take 1 tablet (100 mg) by mouth 2 times daily    Mood disorder (H)  Stable.   - buPROPion (WELLBUTRIN XL) 300 MG 24 hr tablet; Take 1 tablet (300 mg) by mouth every morning    The patient indicates understanding of these issues and agrees with the plan.    THALIA Armstrong [facility]

## 2024-02-16 DIAGNOSIS — E66.811 CLASS 1 OBESITY WITHOUT SERIOUS COMORBIDITY WITH BODY MASS INDEX (BMI) OF 32.0 TO 32.9 IN ADULT, UNSPECIFIED OBESITY TYPE: ICD-10-CM

## 2024-02-16 RX ORDER — METFORMIN HCL 500 MG
1000 TABLET, EXTENDED RELEASE 24 HR ORAL
Qty: 180 TABLET | Refills: 0 | Status: SHIPPED | OUTPATIENT
Start: 2024-02-16 | End: 2024-05-16

## 2024-02-23 ENCOUNTER — HOSPITAL ENCOUNTER (OUTPATIENT)
Dept: MAMMOGRAPHY | Facility: CLINIC | Age: 44
Discharge: HOME | End: 2024-02-23
Attending: PHYSICIAN ASSISTANT | Admitting: PHYSICIAN ASSISTANT
Payer: COMMERCIAL

## 2024-02-23 ENCOUNTER — OFFICE VISIT (OUTPATIENT)
Dept: FAMILY MEDICINE | Facility: CLINIC | Age: 44
End: 2024-02-23
Payer: COMMERCIAL

## 2024-02-23 VITALS
BODY MASS INDEX: 34.15 KG/M2 | RESPIRATION RATE: 18 BRPM | HEART RATE: 73 BPM | WEIGHT: 200 LBS | HEIGHT: 64 IN | SYSTOLIC BLOOD PRESSURE: 100 MMHG | DIASTOLIC BLOOD PRESSURE: 60 MMHG | OXYGEN SATURATION: 98 % | TEMPERATURE: 97.7 F

## 2024-02-23 DIAGNOSIS — E66.812 CLASS 2 OBESITY WITHOUT SERIOUS COMORBIDITY WITH BODY MASS INDEX (BMI) OF 35.0 TO 35.9 IN ADULT, UNSPECIFIED OBESITY TYPE: ICD-10-CM

## 2024-02-23 DIAGNOSIS — Z13.1 SCREENING FOR DIABETES MELLITUS: ICD-10-CM

## 2024-02-23 DIAGNOSIS — Z00.00 ROUTINE GENERAL MEDICAL EXAMINATION AT A HEALTH CARE FACILITY: Primary | ICD-10-CM

## 2024-02-23 DIAGNOSIS — Z13.220 LIPID SCREENING: ICD-10-CM

## 2024-02-23 DIAGNOSIS — Z12.31 VISIT FOR SCREENING MAMMOGRAM: ICD-10-CM

## 2024-02-23 DIAGNOSIS — N93.9 ABNORMAL UTERINE BLEEDING (AUB): ICD-10-CM

## 2024-02-23 DIAGNOSIS — L30.9 ECZEMA, UNSPECIFIED TYPE: ICD-10-CM

## 2024-02-23 DIAGNOSIS — Z12.4 CERVICAL CANCER SCREENING: ICD-10-CM

## 2024-02-23 PROBLEM — F31.89: Status: RESOLVED | Noted: 2018-03-15 | Resolved: 2024-02-23

## 2024-02-23 LAB
ANION GAP SERPL CALCULATED.3IONS-SCNC: 9 MMOL/L (ref 7–15)
BUN SERPL-MCNC: 10.9 MG/DL (ref 6–20)
CALCIUM SERPL-MCNC: 8.9 MG/DL (ref 8.6–10)
CHLORIDE SERPL-SCNC: 102 MMOL/L (ref 98–107)
CHOLEST SERPL-MCNC: 251 MG/DL
CREAT SERPL-MCNC: 0.75 MG/DL (ref 0.51–0.95)
DEPRECATED HCO3 PLAS-SCNC: 25 MMOL/L (ref 22–29)
EGFRCR SERPLBLD CKD-EPI 2021: >90 ML/MIN/1.73M2
ERYTHROCYTE [DISTWIDTH] IN BLOOD BY AUTOMATED COUNT: 13.7 % (ref 10–15)
FASTING STATUS PATIENT QL REPORTED: YES
GLUCOSE SERPL-MCNC: 84 MG/DL (ref 70–99)
HCT VFR BLD AUTO: 40.6 % (ref 35–47)
HDLC SERPL-MCNC: 58 MG/DL
HGB BLD-MCNC: 13.3 G/DL (ref 11.7–15.7)
LDLC SERPL CALC-MCNC: 174 MG/DL
MCH RBC QN AUTO: 30.2 PG (ref 26.5–33)
MCHC RBC AUTO-ENTMCNC: 32.8 G/DL (ref 31.5–36.5)
MCV RBC AUTO: 92 FL (ref 78–100)
NONHDLC SERPL-MCNC: 193 MG/DL
PLATELET # BLD AUTO: 298 10E3/UL (ref 150–450)
POTASSIUM SERPL-SCNC: 4 MMOL/L (ref 3.4–5.3)
RBC # BLD AUTO: 4.41 10E6/UL (ref 3.8–5.2)
SODIUM SERPL-SCNC: 136 MMOL/L (ref 135–145)
TRIGL SERPL-MCNC: 94 MG/DL
TSH SERPL DL<=0.005 MIU/L-ACNC: 1.32 UIU/ML (ref 0.3–4.2)
WBC # BLD AUTO: 8.4 10E3/UL (ref 4–11)

## 2024-02-23 PROCEDURE — 99213 OFFICE O/P EST LOW 20 MIN: CPT | Mod: 25 | Performed by: PHYSICIAN ASSISTANT

## 2024-02-23 PROCEDURE — 80048 BASIC METABOLIC PNL TOTAL CA: CPT | Performed by: PHYSICIAN ASSISTANT

## 2024-02-23 PROCEDURE — 85027 COMPLETE CBC AUTOMATED: CPT | Performed by: PHYSICIAN ASSISTANT

## 2024-02-23 PROCEDURE — 87624 HPV HI-RISK TYP POOLED RSLT: CPT | Performed by: PHYSICIAN ASSISTANT

## 2024-02-23 PROCEDURE — 99396 PREV VISIT EST AGE 40-64: CPT | Performed by: PHYSICIAN ASSISTANT

## 2024-02-23 PROCEDURE — 84443 ASSAY THYROID STIM HORMONE: CPT | Performed by: PHYSICIAN ASSISTANT

## 2024-02-23 PROCEDURE — G0145 SCR C/V CYTO,THINLAYER,RESCR: HCPCS | Performed by: PHYSICIAN ASSISTANT

## 2024-02-23 PROCEDURE — 80061 LIPID PANEL: CPT | Performed by: PHYSICIAN ASSISTANT

## 2024-02-23 PROCEDURE — 36415 COLL VENOUS BLD VENIPUNCTURE: CPT | Performed by: PHYSICIAN ASSISTANT

## 2024-02-23 PROCEDURE — 77063 BREAST TOMOSYNTHESIS BI: CPT

## 2024-02-23 RX ORDER — TRIAMCINOLONE ACETONIDE 1 MG/G
CREAM TOPICAL 2 TIMES DAILY
Qty: 45 G | Refills: 2 | Status: SHIPPED | OUTPATIENT
Start: 2024-02-23 | End: 2024-08-21

## 2024-02-23 SDOH — HEALTH STABILITY: PHYSICAL HEALTH: ON AVERAGE, HOW MANY DAYS PER WEEK DO YOU ENGAGE IN MODERATE TO STRENUOUS EXERCISE (LIKE A BRISK WALK)?: 2 DAYS

## 2024-02-23 ASSESSMENT — SOCIAL DETERMINANTS OF HEALTH (SDOH): HOW OFTEN DO YOU GET TOGETHER WITH FRIENDS OR RELATIVES?: TWICE A WEEK

## 2024-02-23 ASSESSMENT — PAIN SCALES - GENERAL: PAINLEVEL: NO PAIN (0)

## 2024-02-23 NOTE — PROGRESS NOTES
"Preventive Care Visit  [facility]  Kyleigh Nichols PA-C, Family Medicine  Feb 23, 2024    Assessment & Plan     Routine general medical examination at a health care facility  Vaccinations reviewed and declined at this time.    Cervical cancer screening  - Pap Screen with HPV - recommended age 30 - 65 years  - HPV Hold (Lab Only)  - HPV High Risk Types DNA Cervical    Lipid screening  - Lipid panel reflex to direct LDL Non-fasting; Future  - Lipid panel reflex to direct LDL Non-fasting    Class 2 obesity without serious comorbidity with body mass index (BMI) of 35.0 to 35.9 in adult, unspecified obesity type  Encouraged ongoing diet and exercise modifications as able. Will trial Zepbound pending insurance coverage. Titrate pending response.   - tirzepatide-Weight Management (ZEPBOUND) 2.5 MG/0.5ML prefilled pen; Inject 0.5 mLs (2.5 mg) Subcutaneous every 7 days    Screening for diabetes mellitus  - Basic metabolic panel  (Ca, Cl, CO2, Creat, Gluc, K, Na, BUN); Future  - Basic metabolic panel  (Ca, Cl, CO2, Creat, Gluc, K, Na, BUN)    Eczema, unspecified type  - triamcinolone (KENALOG) 0.1 % external cream; Apply topically 2 times daily    Abnormal uterine bleeding (AUB)  - CBC with platelets  - TSH with free T4 reflex    Patient has been advised of split billing requirements and indicates understanding: Yes      BMI  Estimated body mass index is 34.15 kg/m  as calculated from the following:    Height as of this encounter: 1.63 m (5' 4.17\").    Weight as of this encounter: 90.7 kg (200 lb).   Weight management plan: Discussed healthy diet and exercise guidelines    Counseling  Appropriate preventive services were discussed with this patient, including applicable screening as appropriate for fall prevention, nutrition, physical activity, Tobacco-use cessation, weight loss and cognition.  Checklist reviewing preventive services available has been given to the patient.  Reviewed patient's " diet, addressing concerns and/or questions.   She is at risk for lack of exercise and has been provided with information to increase physical activity for the benefit of her well-being.       Subjective   marlys is a 44 year old, presenting for the following:  Physical        2/23/2024    10:20 AM   Additional Questions   Roomed by Maribel PATEL        Via the Health Maintenance questionnaire, the patient has reported the following services have been completed -Mammogram, this information has been sent to the abstraction team.  Health Care Directive  Patient does not have a Health Care Directive or Living Will: Discussed advance care planning with patient; however, patient declined at this time.    HPI  Would like to discuss options for weight management.        2/23/2024   General Health   How would you rate your overall physical health? (!) FAIR   Feel stress (tense, anxious, or unable to sleep) Rather much   (!) STRESS CONCERN      2/23/2024   Nutrition   Three or more servings of calcium each day? (!) I DON'T KNOW   Diet: Regular (no restrictions)   How many servings of fruit and vegetables per day? (!) 0-1   How many sweetened beverages each day? 0-1         2/23/2024   Exercise   Days per week of moderate/strenous exercise 2 days   (!) EXERCISE CONCERN      2/23/2024   Social Factors   Frequency of gathering with friends or relatives Twice a week   Worry food won't last until get money to buy more No   Food not last or not have enough money for food? No   Do you have housing?  Yes   Are you worried about losing your housing? No   Lack of transportation? No   Unable to get utilities (heat,electricity)? No         2/23/2024   Dental   Dentist two times every year? Yes         2/23/2024   TB Screening   Were you born outside of [place]?  (!) YES           Today's PHQ-2 Score:       2/23/2024     9:51 AM   PHQ-2 ( 1999 [employer])   Q1: Little interest or pleasure in doing things 1   Q2: Feeling down, depressed or hopeless 1    PHQ-2 Score 2   Q1: Little interest or pleasure in doing things Several days   Q2: Feeling down, depressed or hopeless Several days   PHQ-2 Score 2           2024   Substance Use   Alcohol more than 3/day or more than 7/wk No   Do you use any other substances recreationally? No     Social History     Tobacco Use    Smoking status: Former     Packs/day: 1.00     Years: 3.00     Additional pack years: 0.00     Total pack years: 3.00     Types: Cigarettes     Quit date: 2005     Years since quittin.8    Smokeless tobacco: Never   Vaping Use    Vaping Use: Never used   Substance Use Topics    Alcohol use: Yes     Comment: 1 drink every 2 weeks or so    Drug use: No             2024   Breast Cancer Screening   Family history of breast, colon, or ovarian cancer? No / Unknown          No data to display                 Mammogram Screening - Mammogram every 1-2 years updated in Health Maintenance based on mutual decision making        2024   STI Screening   New sexual partner(s) since last STI/HIV test? No     History of abnormal Pap smear: NO - age 30-65 PAP every 5 years with negative HPV co-testing recommended        Latest Ref Rng & Units 8/15/2018     2:30 PM 8/15/2018     2:00 PM 2015    12:00 AM   PAP / HPV   PAP (Historical)  NIL   NIL    HPV 16 DNA NEG^Negative  Negative     HPV 18 DNA NEG^Negative  Negative     Other HR HPV NEG^Negative  Negative       ASCVD Risk   The ASCVD Risk score (Court SMITH, et al., 2019) failed to calculate for the following reasons:    Cannot find a previous HDL lab    Cannot find a previous total cholesterol lab        2024   Contraception/Family Planning   Questions about contraception or family planning No        Reviewed and updated as needed this visit by Provider                    BP Readings from Last 3 Encounters:   24 100/60   23 110/70   10/03/22 122/74    Wt Readings from Last 3 Encounters:   24 90.7 kg (200 lb)    23 85 kg (187 lb 8 oz)   10/03/22 89.8 kg (198 lb)                  Patient Active Problem List   Diagnosis    CARDIOVASCULAR SCREENING; LDL GOAL LESS THAN 160    H/O:     Generalized anxiety disorder    Generalized hyperhidrosis    External hemorrhoids    Attention deficit hyperactivity disorder (ADHD), combined type     Past Surgical History:   Procedure Laterality Date     SECTION  12/15/2010     SECTION performed by ROSA HYMAN at  [facility]    COLONOSCOPY N/A 2015    Procedure: COMBINED COLONOSCOPY, SINGLE OR MULTIPLE BIOPSY/POLYPECTOMY BY BIOPSY;  Surgeon: Zenon Jay MD;  Location:  GI    ESOPHAGOSCOPY, GASTROSCOPY, DUODENOSCOPY (EGD), COMBINED N/A 2015    Procedure: COMBINED ESOPHAGOSCOPY, GASTROSCOPY, DUODENOSCOPY (EGD), BIOPSY SINGLE OR MULTIPLE;  Surgeon: Zenon Jay MD;  Location:  GI    EXCISE LESN NECK/CHEST,SUBCUTAN  childhood    remove some lumps from the neck as a child    HEMORRHOIDECTOMY EXTERNAL N/A 2015    Procedure: HEMORRHOIDECTOMY EXTERNAL;  Surgeon: Jose Potter MD;  Location:  OR       Social History     Tobacco Use    Smoking status: Former     Packs/day: 1.00     Years: 3.00     Additional pack years: 0.00     Total pack years: 3.00     Types: Cigarettes     Quit date: 2005     Years since quittin.8    Smokeless tobacco: Never   Substance Use Topics    Alcohol use: Yes     Comment: 1 drink every 2 weeks or so     Family History   Problem Relation Age of Onset    Allergies Mother         bees and grass,pollens    Respiratory Mother         asthma    Alcohol/Drug Father         Simone's father    Alcohol/Drug Sister         Simone's sister    Alcohol/Drug Brother         Simone's brother    Allergies Brother         Grass and pollens.    Diabetes Maternal Grandmother         Adult onset    Heart Disease Maternal Grandmother     Hypertension Maternal Grandmother     Lipids Maternal Grandmother     Neurologic  "Disorder Brother     Respiratory Brother         asthma    Cerebrovascular Disease Paternal Grandmother         CVa age 35    Obesity Maternal Aunt     Obesity Maternal Uncle     Gastrointestinal Disease Other         Crohn's first cousin         Current Outpatient Medications   Medication Sig Dispense Refill    metFORMIN (GLUCOPHAGE XR) 500 MG 24 hr tablet TAKE 2 TABLETS (1,000 MG) BY MOUTH DAILY (WITH DINNER) 180 tablet 0    tirzepatide-Weight Management (ZEPBOUND) 2.5 MG/0.5ML prefilled pen Inject 0.5 mLs (2.5 mg) Subcutaneous every 7 days 2 mL 0    triamcinolone (KENALOG) 0.1 % external cream Apply topically 2 times daily 45 g 2         Review of Systems  Constitutional, HEENT, cardiovascular, pulmonary, GI, , musculoskeletal, neuro, skin, endocrine and psych systems are negative, except as otherwise noted.     Objective    Exam  /60   Pulse 73   Temp 97.7  F (36.5  C) (Temporal)   Resp 18   Ht 1.63 m (5' 4.17\")   Wt 90.7 kg (200 lb)   LMP  (LMP Unknown)   SpO2 98%   BMI 34.15 kg/m     Estimated body mass index is 34.15 kg/m  as calculated from the following:    Height as of this encounter: 1.63 m (5' 4.17\").    Weight as of this encounter: 90.7 kg (200 lb).    Physical Exam  GENERAL: alert and no distress  EYES: Eyes grossly normal to inspection, PERRL and conjunctivae and sclerae normal  HENT: ear canals and TM's normal, nose and mouth without ulcers or lesions  NECK: no adenopathy, no asymmetry, masses, or scars  RESP: lungs clear to auscultation - no rales, rhonchi or wheezes  CV: regular rate and rhythm, normal S1 S2, no S3 or S4, no murmur, click or rub, no peripheral edema  ABDOMEN: soft, nontender, no hepatosplenomegaly, no masses and bowel sounds normal   (female) w/bimanual: normal female external genitalia, normal urethral meatus, normal vaginal mucosa, and normal cervix/adnexa/uterus without masses or discharge  MS: no gross musculoskeletal defects noted, no edema  SKIN: no " suspicious lesions or rashes  NEURO: Normal strength and tone, mentation intact and speech normal  PSYCH: mentation appears normal, affect normal/bright      Signed Electronically by: Kyleigh Nichols PA-C

## 2024-02-28 LAB
BKR LAB AP GYN ADEQUACY: NORMAL
BKR LAB AP GYN INTERPRETATION: NORMAL
BKR LAB AP HPV REFLEX: NORMAL
BKR LAB AP PREVIOUS ABNORMAL: NORMAL
PATH REPORT.COMMENTS IMP SPEC: NORMAL
PATH REPORT.COMMENTS IMP SPEC: NORMAL
PATH REPORT.RELEVANT HX SPEC: NORMAL

## 2024-03-01 ENCOUNTER — MYC REFILL (OUTPATIENT)
Dept: FAMILY MEDICINE | Facility: CLINIC | Age: 44
End: 2024-03-01
Payer: COMMERCIAL

## 2024-03-01 DIAGNOSIS — E66.812 CLASS 2 OBESITY WITHOUT SERIOUS COMORBIDITY WITH BODY MASS INDEX (BMI) OF 35.0 TO 35.9 IN ADULT, UNSPECIFIED OBESITY TYPE: ICD-10-CM

## 2024-03-01 LAB
HUMAN PAPILLOMA VIRUS 16 DNA: NEGATIVE
HUMAN PAPILLOMA VIRUS 18 DNA: NEGATIVE
HUMAN PAPILLOMA VIRUS FINAL DIAGNOSIS: NORMAL
HUMAN PAPILLOMA VIRUS OTHER HR: NEGATIVE

## 2024-03-14 ENCOUNTER — MYC MEDICAL ADVICE (OUTPATIENT)
Dept: FAMILY MEDICINE | Facility: CLINIC | Age: 44
End: 2024-03-14
Payer: COMMERCIAL

## 2024-03-19 ENCOUNTER — TRANSFERRED RECORDS (OUTPATIENT)
Dept: HEALTH INFORMATION MANAGEMENT | Facility: CLINIC | Age: 44
End: 2024-03-19
Payer: COMMERCIAL

## 2024-03-19 LAB — RETINOPATHY: NEGATIVE

## 2024-03-21 DIAGNOSIS — E66.812 CLASS 2 OBESITY WITHOUT SERIOUS COMORBIDITY WITH BODY MASS INDEX (BMI) OF 35.0 TO 35.9 IN ADULT, UNSPECIFIED OBESITY TYPE: ICD-10-CM

## 2024-03-22 ENCOUNTER — HOSPITAL ENCOUNTER (OUTPATIENT)
Dept: ULTRASOUND IMAGING | Facility: CLINIC | Age: 44
Discharge: HOME | End: 2024-03-22
Attending: PHYSICIAN ASSISTANT
Payer: COMMERCIAL

## 2024-03-22 ENCOUNTER — HOSPITAL ENCOUNTER (OUTPATIENT)
Dept: MAMMOGRAPHY | Facility: CLINIC | Age: 44
Discharge: HOME | End: 2024-03-22
Attending: PHYSICIAN ASSISTANT
Payer: COMMERCIAL

## 2024-03-22 DIAGNOSIS — N93.9 ABNORMAL UTERINE BLEEDING (AUB): ICD-10-CM

## 2024-03-22 DIAGNOSIS — R92.8 ABNORMAL MAMMOGRAM: ICD-10-CM

## 2024-03-22 PROCEDURE — 76642 ULTRASOUND BREAST LIMITED: CPT | Mod: RT

## 2024-03-22 PROCEDURE — 77061 BREAST TOMOSYNTHESIS UNI: CPT | Mod: RT

## 2024-03-22 PROCEDURE — 76830 TRANSVAGINAL US NON-OB: CPT

## 2024-03-24 RX ORDER — TIRZEPATIDE 5 MG/.5ML
5 INJECTION, SOLUTION SUBCUTANEOUS
Qty: 2 ML | Refills: 0 | Status: SHIPPED | OUTPATIENT
Start: 2024-03-24 | End: 2024-04-19

## 2024-03-27 ENCOUNTER — TELEPHONE (OUTPATIENT)
Dept: FAMILY MEDICINE | Facility: CLINIC | Age: 44
End: 2024-03-27
Payer: COMMERCIAL

## 2024-03-27 NOTE — TELEPHONE ENCOUNTER
Attempted to reach patient unable to leave message, mailbox is full.   Maribel Donohue MA       Everything looked good with your ultrasound results. If the bleeding has been persisting I can certainly connect you with OB/GYN to discuss further treatments. Please let me know if you have any questions/concerns.     Kyleigh Nichols

## 2024-03-27 NOTE — TELEPHONE ENCOUNTER
----- Message from Kyleigh Nichols PA-C sent at 3/27/2024  7:24 AM CDT -----  Please notify patient/parent of result as [employer] message not read.    Kyleigh Nichols PA-C

## 2024-03-27 NOTE — LETTER
2024      marlys [facility] [id number]        Dear ,    We are writing to inform you of your test results.    Everything looked good with your ultrasound results. If the bleeding has been persisting I can certainly connect you with OB/GYN to discuss further treatments. Please let me know if you have any questions/concerns.     Kyleigh Nichols    Resulted Orders   US Pelvic Complete with Transvaginal    Narrative    US PELVIC TRANSABDOMINAL AND TRANSVAGINAL 3/22/2024 9:11 AM    CLINICAL HISTORY: Abnormal uterine bleeding (AUB).    TECHNIQUE: Transabdominal scans were performed. Endovaginal ultrasound  was performed to better visualize the adnexa.    COMPARISON: 2021    FINDINGS:    UTERUS: 10 x 5.5 x 5.1 cm. Normal in size and position with no masses.  A  section scar is noted in the anterior lower uterine  segment. Trace fluid is seen at the scar.    ENDOMETRIUM: 9 mm. Normal smooth endometrium.    RIGHT OVARY: 2.5 x 1.9 x 1.2 cm. Normal with flow demonstrated.    LEFT OVARY: 3 x 1.4 x 0.8 cm. Normal with flow demonstrated.    No significant free fluid.      Impression    IMPRESSION:  1.  Nonacute pelvic ultrasound.  2.  Endometrial stripe measures 9 mm.    SHANNON LAOZ MD         SYSTEM ID:  [id number]       If you have any questions or concerns, please call the clinic at the number listed above.

## 2024-03-29 ENCOUNTER — TELEPHONE (OUTPATIENT)
Dept: FAMILY MEDICINE | Facility: CLINIC | Age: 44
End: 2024-03-29

## 2024-03-29 NOTE — TELEPHONE ENCOUNTER
Prior Authorization Retail Medication Request    Medication/Dose: tirzepatide (MOUNJARO) 5 MG/0.5ML pen  Diagnosis and ICD code (if different than what is on RX):  Class 2 obesity without serious comorbidity with body mass index (BMI) of 35.0 to 35.9 in adult, unspecified obesity type [E66.9, Z68.35]   New/renewal/insurance change PA/secondary ins. PA:  Previously Tried and Failed:    Rationale:      Insurance   Primary: [employer] [place]   Insurance ID:  [id number]     Secondary (if applicable):  Insurance ID:      Pharmacy Information (if different than what is on RX)  Name:  [facility]  Phone:  [phone]   Fax:    [phone]

## 2024-04-10 NOTE — TELEPHONE ENCOUNTER
Central Prior Authorization Team   Phone: [phone]    PA Initiation    Medication: tirzepatide (MOUNJARO) 5 MG/0.5ML pen  Insurance Company: [employer] ([facility]) - Phone [phone] Fax [phone]  Pharmacy Filling the Rx: [id number] [place] - [address]   Filling Pharmacy Phone: [phone]  Filling Pharmacy Fax:    Start Date: 4/10/2024

## 2024-04-11 NOTE — TELEPHONE ENCOUNTER
Prior Authorization Not Needed per Insurance    Medication: tirzepatide (MOUNJARO) 5 MG/0.5ML pen-PA NOT NEEDED   Insurance Company: [employer] ([facility]) - Phone [phone] Fax [phone]  Expected CoPay:      Pharmacy Filling the Rx: [id number] - [place], [place] - [address] [place] [address]  Pharmacy Notified:  Yes  Patient Notified:  No    Insurance states that PA is Not Needed and medication is covered. Called pharmacy and pharmacy stated that PA is Not Needed and medication is covered. Pharmacy stated that they have a paid claim on medication on 3/29/2024 and patient has picked up medication.

## 2024-04-17 DIAGNOSIS — E66.812 CLASS 2 OBESITY WITHOUT SERIOUS COMORBIDITY WITH BODY MASS INDEX (BMI) OF 35.0 TO 35.9 IN ADULT, UNSPECIFIED OBESITY TYPE: ICD-10-CM

## 2024-04-19 RX ORDER — TIRZEPATIDE 5 MG/.5ML
5 INJECTION, SOLUTION SUBCUTANEOUS
Qty: 2 ML | Refills: 0 | Status: SHIPPED | OUTPATIENT
Start: 2024-04-19 | End: 2024-05-07

## 2024-04-25 ENCOUNTER — MYC REFILL (OUTPATIENT)
Dept: FAMILY MEDICINE | Facility: CLINIC | Age: 44
End: 2024-04-25
Payer: COMMERCIAL

## 2024-04-25 DIAGNOSIS — E66.812 CLASS 2 OBESITY WITHOUT SERIOUS COMORBIDITY WITH BODY MASS INDEX (BMI) OF 35.0 TO 35.9 IN ADULT, UNSPECIFIED OBESITY TYPE: ICD-10-CM

## 2024-04-26 RX ORDER — TIRZEPATIDE 5 MG/.5ML
5 INJECTION, SOLUTION SUBCUTANEOUS
Qty: 2 ML | Refills: 0 | Status: CANCELLED | OUTPATIENT
Start: 2024-04-26

## 2024-04-26 RX ORDER — TIRZEPATIDE 2.5 MG/.5ML
2.5 INJECTION, SOLUTION SUBCUTANEOUS
Qty: 2 ML | Refills: 1 | Status: SHIPPED | OUTPATIENT
Start: 2024-04-26 | End: 2024-06-24

## 2024-05-07 DIAGNOSIS — E66.812 CLASS 2 OBESITY WITHOUT SERIOUS COMORBIDITY WITH BODY MASS INDEX (BMI) OF 35.0 TO 35.9 IN ADULT, UNSPECIFIED OBESITY TYPE: ICD-10-CM

## 2024-05-07 RX ORDER — TIRZEPATIDE 5 MG/.5ML
5 INJECTION, SOLUTION SUBCUTANEOUS
Qty: 2 ML | Refills: 0 | Status: SHIPPED | OUTPATIENT
Start: 2024-05-07 | End: 2024-05-22

## 2024-05-07 NOTE — TELEPHONE ENCOUNTER
Patient called and would like to have a prescription on file for Mounjaro 5mg for when the pharmacy can get it in stock.    Thank you,  Dara Bills, Pharmacy Technician  [facility]

## 2024-05-16 DIAGNOSIS — E66.811 CLASS 1 OBESITY WITHOUT SERIOUS COMORBIDITY WITH BODY MASS INDEX (BMI) OF 32.0 TO 32.9 IN ADULT, UNSPECIFIED OBESITY TYPE: ICD-10-CM

## 2024-05-16 RX ORDER — METFORMIN HCL 500 MG
1000 TABLET, EXTENDED RELEASE 24 HR ORAL
Qty: 180 TABLET | Refills: 0 | Status: SHIPPED | OUTPATIENT
Start: 2024-05-16

## 2024-05-21 DIAGNOSIS — E66.812 CLASS 2 OBESITY WITHOUT SERIOUS COMORBIDITY WITH BODY MASS INDEX (BMI) OF 35.0 TO 35.9 IN ADULT, UNSPECIFIED OBESITY TYPE: ICD-10-CM

## 2024-05-22 RX ORDER — TIRZEPATIDE 7.5 MG/.5ML
7.5 INJECTION, SOLUTION SUBCUTANEOUS
Qty: 2 ML | Refills: 0 | Status: SHIPPED | OUTPATIENT
Start: 2024-05-22 | End: 2024-06-19

## 2024-06-19 DIAGNOSIS — E66.812 CLASS 2 OBESITY WITHOUT SERIOUS COMORBIDITY WITH BODY MASS INDEX (BMI) OF 35.0 TO 35.9 IN ADULT, UNSPECIFIED OBESITY TYPE: ICD-10-CM

## 2024-06-24 ENCOUNTER — TELEPHONE (OUTPATIENT)
Dept: FAMILY MEDICINE | Facility: CLINIC | Age: 44
End: 2024-06-24
Payer: COMMERCIAL

## 2024-06-24 DIAGNOSIS — E66.812 CLASS 2 OBESITY WITHOUT SERIOUS COMORBIDITY WITH BODY MASS INDEX (BMI) OF 35.0 TO 35.9 IN ADULT, UNSPECIFIED OBESITY TYPE: Primary | ICD-10-CM

## 2024-06-24 NOTE — TELEPHONE ENCOUNTER
I see that you had wrote a prescription for Mounjaro 10mg/0.5ml however, the patient would like to stay on the 7.5mg strength. If you agree could you please send a new prescription.    Thank you,  Dara Bills, Pharmacy Technician  [facility]

## 2024-07-16 DIAGNOSIS — E66.812 CLASS 2 OBESITY WITHOUT SERIOUS COMORBIDITY WITH BODY MASS INDEX (BMI) OF 35.0 TO 35.9 IN ADULT, UNSPECIFIED OBESITY TYPE: Primary | ICD-10-CM

## 2024-08-21 ENCOUNTER — VIRTUAL VISIT (OUTPATIENT)
Dept: FAMILY MEDICINE | Facility: CLINIC | Age: 44
End: 2024-08-21
Payer: COMMERCIAL

## 2024-08-21 DIAGNOSIS — E66.811 CLASS 1 OBESITY WITHOUT SERIOUS COMORBIDITY WITH BODY MASS INDEX (BMI) OF 32.0 TO 32.9 IN ADULT, UNSPECIFIED OBESITY TYPE: Primary | ICD-10-CM

## 2024-08-21 DIAGNOSIS — L30.9 ECZEMA, UNSPECIFIED TYPE: ICD-10-CM

## 2024-08-21 PROCEDURE — 99213 OFFICE O/P EST LOW 20 MIN: CPT | Mod: 95 | Performed by: PHYSICIAN ASSISTANT

## 2024-08-21 RX ORDER — TRIAMCINOLONE ACETONIDE 1 MG/G
CREAM TOPICAL 2 TIMES DAILY
Qty: 45 G | Refills: 2 | Status: SHIPPED | OUTPATIENT
Start: 2024-08-21

## 2024-08-21 NOTE — PROGRESS NOTES
marlys is a 44 year old who is being evaluated via a billable video visit.    How would you like to obtain your AVS? MyChart  If the video visit is dropped, the invitation should be resent by: Text to cell phone: [phone]  Will anyone else be joining your video visit? No        Subjective   marlys is a 44 year old, presenting for the following health issues:  Recheck Medication      8/21/2024     1:46 PM   Additional Questions   Roomed by Marya PATEL     History of Present Illness       Reason for visit:  Follow up mounjaro      Visit scheduled today for follow-up of weight loss with Mounjaro. She has been doing really well. Weight at 146 lbs this morning. This is a more than 50 pound weight loss over the last 5 months. She is tolerating the injections well. Would like to remain at the 10 mg dose for now. She is hoping to lose a few more pounds and then start decreasing dose on Mounjaro. Working on diet and exercise modifications as well.       Review of Systems  Constitutional, HEENT, cardiovascular, pulmonary, GI, , musculoskeletal, neuro, skin, endocrine and psych systems are negative, except as otherwise noted.      Objective    Vitals - Patient Reported  Pain Score: No Pain (0)        Physical Exam   GENERAL: alert and no distress  EYES: Eyes grossly normal to inspection.  No discharge or erythema, or obvious scleral/conjunctival abnormalities.  RESP: No audible wheeze, cough, or visible cyanosis.    SKIN: Visible skin clear. No significant rash, abnormal pigmentation or lesions.  NEURO: Cranial nerves grossly intact.  Mentation and speech appropriate for age.  PSYCH: Appropriate affect, tone, and pace of words        Assessment & Plan     Class 1 obesity without serious comorbidity with body mass index (BMI) of 32.0 to 32.9 in adult, unspecified obesity type  Patient doing exceptionally well losing more than 50 pounds over the last 5 months. Congratulated her on her success thus far. Will continue 10 mg dose for  now. Hopeful to lose another 5-10 pounds before tapering down. Encouraged ongoing diet and exercise modifications.     Eczema, unspecified type  Uses as needed.   - triamcinolone (KENALOG) 0.1 % external cream; Apply topically 2 times daily.    Video-Visit Details    Type of service:  Video Visit   Originating Location (pt. Location): Home    Distant Location (provider location):  On-site  Platform used for Video Visit: Jamal  Signed Electronically by: Kyleigh Nichols PA-C

## 2024-09-03 DIAGNOSIS — E66.812 CLASS 2 OBESITY WITHOUT SERIOUS COMORBIDITY WITH BODY MASS INDEX (BMI) OF 35.0 TO 35.9 IN ADULT, UNSPECIFIED OBESITY TYPE: ICD-10-CM

## 2024-09-03 RX ORDER — TIRZEPATIDE 10 MG/.5ML
INJECTION, SOLUTION SUBCUTANEOUS
Qty: 2 ML | Refills: 1 | Status: SHIPPED | OUTPATIENT
Start: 2024-09-03

## 2024-10-29 DIAGNOSIS — E66.812 CLASS 2 OBESITY WITHOUT SERIOUS COMORBIDITY WITH BODY MASS INDEX (BMI) OF 35.0 TO 35.9 IN ADULT, UNSPECIFIED OBESITY TYPE: ICD-10-CM

## 2024-10-30 RX ORDER — TIRZEPATIDE 10 MG/.5ML
INJECTION, SOLUTION SUBCUTANEOUS
Qty: 2 ML | Refills: 1 | Status: SHIPPED | OUTPATIENT
Start: 2024-10-30

## 2025-03-10 DIAGNOSIS — E66.812 CLASS 2 OBESITY WITHOUT SERIOUS COMORBIDITY WITH BODY MASS INDEX (BMI) OF 35.0 TO 35.9 IN ADULT, UNSPECIFIED OBESITY TYPE: ICD-10-CM

## 2025-03-10 NOTE — TELEPHONE ENCOUNTER
Clinic RN: Please investigate patient's chart or contact patient if the information cannot be found because patient should have run out of this medication on 9/24/24. Confirm patient is taking this medication as prescribed. Document findings and route refill encounter to provider for approval or denial.

## 2025-03-11 NOTE — TELEPHONE ENCOUNTER
RN TRIAGE CALL:    Patient Contact    Attempt # 1    Was call answered?  No.  Unable to leave message. Mailbox is full. Sent MyChart.     Pending refill request from pharmacy is for 7.5 mg once weekly, but most recent prescription sent for this medication was for 10 mg once weekly. Once current dose is clarified, please route to provider for review.     NAT ColemanN, RN

## 2025-03-12 NOTE — TELEPHONE ENCOUNTER
Called and spoke with patient. Patient states she has lost the weight that she has wanted and would like to start eventually tapering off. Would like to continue 7.5mg and see how she does, if she can keep weight off. Last provider note 8/21/24 does mention patient would plan to taper after losing a few more pounds.     Will route to provider for review. Patient asking when does provider want patient to be seen for follow up?     Can notify through [employer]. 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 Does not prefer phone as mailbox is not set up.     Ratna Fuentes RN on 3/12/2025 at 10:29 AM

## 2025-03-13 RX ORDER — TIRZEPATIDE 7.5 MG/.5ML
INJECTION, SOLUTION SUBCUTANEOUS
Qty: 2 ML | Refills: 2 | Status: SHIPPED | OUTPATIENT
Start: 2025-03-13

## 2025-06-16 DIAGNOSIS — E66.812 CLASS 2 OBESITY WITHOUT SERIOUS COMORBIDITY WITH BODY MASS INDEX (BMI) OF 35.0 TO 35.9 IN ADULT, UNSPECIFIED OBESITY TYPE: ICD-10-CM

## 2025-06-17 RX ORDER — TIRZEPATIDE 7.5 MG/.5ML
INJECTION, SOLUTION SUBCUTANEOUS
Qty: 2 ML | Refills: 2 | Status: SHIPPED | OUTPATIENT
Start: 2025-06-17

## 2025-07-07 ENCOUNTER — OFFICE VISIT (OUTPATIENT)
Dept: FAMILY MEDICINE | Facility: CLINIC | Age: 45
End: 2025-07-07
Payer: COMMERCIAL

## 2025-07-07 VITALS
TEMPERATURE: 97.5 F | DIASTOLIC BLOOD PRESSURE: 60 MMHG | OXYGEN SATURATION: 100 % | BODY MASS INDEX: 22.36 KG/M2 | SYSTOLIC BLOOD PRESSURE: 100 MMHG | WEIGHT: 131 LBS | HEART RATE: 60 BPM | HEIGHT: 64 IN | RESPIRATION RATE: 16 BRPM

## 2025-07-07 DIAGNOSIS — B35.1 ONYCHOMYCOSIS: ICD-10-CM

## 2025-07-07 DIAGNOSIS — E66.812 CLASS 2 OBESITY WITHOUT SERIOUS COMORBIDITY WITH BODY MASS INDEX (BMI) OF 35.0 TO 35.9 IN ADULT, UNSPECIFIED OBESITY TYPE: Primary | ICD-10-CM

## 2025-07-07 PROCEDURE — 3074F SYST BP LT 130 MM HG: CPT | Performed by: PHYSICIAN ASSISTANT

## 2025-07-07 PROCEDURE — G2211 COMPLEX E/M VISIT ADD ON: HCPCS | Performed by: PHYSICIAN ASSISTANT

## 2025-07-07 PROCEDURE — 3078F DIAST BP <80 MM HG: CPT | Performed by: PHYSICIAN ASSISTANT

## 2025-07-07 PROCEDURE — 99213 OFFICE O/P EST LOW 20 MIN: CPT | Performed by: PHYSICIAN ASSISTANT

## 2025-07-07 PROCEDURE — 1126F AMNT PAIN NOTED NONE PRSNT: CPT | Performed by: PHYSICIAN ASSISTANT

## 2025-07-07 RX ORDER — TERBINAFINE HYDROCHLORIDE 250 MG/1
250 TABLET ORAL DAILY
Qty: 90 TABLET | Refills: 0 | Status: SHIPPED | OUTPATIENT
Start: 2025-07-07 | End: 2025-10-05

## 2025-07-07 RX ORDER — TIRZEPATIDE 7.5 MG/.5ML
7.5 INJECTION, SOLUTION SUBCUTANEOUS WEEKLY
Qty: 2 ML | Refills: 2 | Status: SHIPPED | OUTPATIENT
Start: 2025-07-07

## 2025-07-07 ASSESSMENT — PAIN SCALES - GENERAL: PAINLEVEL_OUTOF10: NO PAIN (0)

## 2025-07-07 NOTE — PROGRESS NOTES
"  Subjective   marlys is a 45 year old, presenting for the following health issues:  Recheck Medication (mounjaro) and Toenail        7/7/2025     9:44 AM   Additional Questions   Roomed by Yelitza MORTON         7/7/2025     9:44 AM   Patient Reported Additional Medications   Patient reports taking the following new medications votamin d3, vitamin b12     Toenail    History of Present Illness       Reason for visit:  Follow up medication    She eats 2-3 servings of fruits and vegetables daily.She consumes 0 sweetened beverage(s) daily.She exercises with enough effort to increase her heart rate 20 to 29 minutes per day.  She exercises with enough effort to increase her heart rate 3 or less days per week.   She is taking medications regularly.   marlys Wilkerson, a 45-year-old female, reports feeling \"amazing\" overall. She has been using Mounjaro, previously at a dose of 10 mg, but recently decreased to 7.5 mg. After reducing the dose, she experienced a 5-pound weight gain but is currently stable at 130 lbs and satisfied with this weight (weight upon starting was 200 lbs). She continues to feel well on the 7.5 mg dose. She notes that if she eats foods she is \"not supposed to,\" she feels sick, and this also occurs if she consumes more than two alcoholic drinks, resulting in waking up at 3 a.m. feeling ill. She describes increased sensitivity to alcohol since starting the medication. She reports a history of overeating for over 40 years and is working on maintaining new routines. She experiences increased hunger and cravings for snacks on the day before her next injection, specifically on Wednesdays when her injection is due on Thursdays. She has been working to incorporate more weight training into her exercise routine.    marlys Wilkerson reports a toenail fungal infection, which she has previously discussed and has been treating topically. She expresses concern about the infection spreading to all her toes and is now willing to take " "oral medication for it. She notes that her  has similar toenail changes on one foot, with white lines, but is not certain if she contracted it from him. She also mentions that her  has a recurrent stye in his eye, for which he intermittently uses prescribed ointment, but the stye recurs when he stops treatment. She denies any other current concerns or symptoms. She states that her blood pressure has been \"fantastic\" and that she generally feels she is \"meant to be\" at her current health status. She reports that her annual labs were last performed the previous year.    Review of Systems  Constitutional, HEENT, cardiovascular, pulmonary, GI, , musculoskeletal, neuro, skin, endocrine and psych systems are negative, except as otherwise noted.      Objective    /60   Pulse 60   Temp 97.5  F (36.4  C) (Temporal)   Resp 16   Ht 1.626 m (5' 4\")   Wt 59.4 kg (131 lb)   SpO2 100%   BMI 22.49 kg/m    Body mass index is 22.49 kg/m .  Physical Exam   GENERAL: alert and no distress  RESP: lungs clear to auscultation - no rales, rhonchi or wheezes  CV: regular rate and rhythm, normal S1 S2, no S3 or S4, no murmur, click or rub, no peripheral edema  MS: no gross musculoskeletal defects noted, no edema  SKIN: thickened, yellowed toenails  NEURO: Normal strength and tone, mentation intact and speech normal  PSYCH: mentation appears normal, affect normal/bright        Assessment & Plan     Class 2 obesity without serious comorbidity with body mass index (BMI) of 35.0 to 35.9 in adult, unspecified obesity type  Patient is doing excellent with her weight loss. She has lost 70 lbs and has been able to slowly decrease her Mounjaro dose. At this time would like to continue 7.5 mg. We did discuss potential of stretching this to every 10 days. She continues to work on dietary changes and routine exercise daily. Working on more weight training.   - Tirzepatide (MOUNJARO) 7.5 MG/0.5ML SOAJ auto-injector pen; Inject " 0.5 mLs (7.5 mg) subcutaneously once a week.    Onychomycosis  Will start course of Lamisil. OTC options discussed as well.   - terbinafine (LAMISIL) 250 MG tablet; Take 1 tablet (250 mg) by mouth daily.    The longitudinal plan of care for the diagnosis(es)/condition(s) as documented were addressed during this visit. Due to the added complexity in care, I will continue to support marlys in the subsequent management and with ongoing continuity of care.    Signed Electronically by: Kyleigh Nichols PA-C